# Patient Record
Sex: FEMALE | Race: WHITE | NOT HISPANIC OR LATINO | Employment: OTHER | URBAN - METROPOLITAN AREA
[De-identification: names, ages, dates, MRNs, and addresses within clinical notes are randomized per-mention and may not be internally consistent; named-entity substitution may affect disease eponyms.]

---

## 2018-04-24 ENCOUNTER — TELEPHONE (OUTPATIENT)
Dept: NEUROSURGERY | Facility: CLINIC | Age: 73
End: 2018-04-24

## 2018-04-24 NOTE — TELEPHONE ENCOUNTER
New Patient    Referring Doctor:  Dr. Velazquez    Most Recent Studies:  MRI Brain (04/20/18) [facility]        Reason for Visit:  Incidental findings on an MRI Brain done in 2009 which is compared on this most recent MRI Brain 2018.  No pain or symptoms.  Spot has gotten bigger.      Additional Comments:  Patient has a history of colon cancer.        Insurance Info:  [employer]/Med[employer]are      See Patient Intake Documents in  for outside reports.

## 2018-04-25 NOTE — TELEPHONE ENCOUNTER
New patient referral  Please schedule as routine new patient appt  Please ensure patient brings new and old MRIs for review and comparison

## 2018-05-01 ENCOUNTER — OFFICE VISIT (OUTPATIENT)
Dept: NEUROSURGERY | Facility: CLINIC | Age: 73
End: 2018-05-01
Payer: MEDICARE

## 2018-05-01 VITALS
HEART RATE: 82 BPM | SYSTOLIC BLOOD PRESSURE: 140 MMHG | TEMPERATURE: 98.4 F | BODY MASS INDEX: 23.39 KG/M2 | WEIGHT: 132 LBS | DIASTOLIC BLOOD PRESSURE: 100 MMHG | HEIGHT: 63 IN

## 2018-05-01 DIAGNOSIS — D32.0 CEREBRAL MENINGIOMA (CMS/HCC): Primary | ICD-10-CM

## 2018-05-01 PROCEDURE — 99204 OFFICE O/P NEW MOD 45 MIN: CPT | Performed by: NEUROLOGICAL SURGERY

## 2018-05-01 RX ORDER — NEBIVOLOL HYDROCHLORIDE 5 MG/1
5 TABLET ORAL DAILY
Refills: 5 | COMMUNITY
Start: 2018-04-02 | End: 2022-07-22 | Stop reason: ENTERED-IN-ERROR

## 2018-05-01 RX ORDER — LORAZEPAM 0.5 MG/1
TABLET ORAL
Refills: 3 | COMMUNITY
Start: 2018-03-06 | End: 2022-07-22 | Stop reason: ENTERED-IN-ERROR

## 2018-05-01 ASSESSMENT — ENCOUNTER SYMPTOMS
POLYPHAGIA: 0
BACK PAIN: 0
HEMATURIA: 0
NUMBNESS: 0
NECK STIFFNESS: 0
HEADACHES: 0
DIFFICULTY URINATING: 0
WHEEZING: 0
ARTHRALGIAS: 0
DYSURIA: 0
VOICE CHANGE: 0
SPEECH DIFFICULTY: 0
PHOTOPHOBIA: 0
NECK PAIN: 0
DIZZINESS: 0
DYSPHORIC MOOD: 0
DIARRHEA: 0
FATIGUE: 0
WEAKNESS: 0
ABDOMINAL DISTENTION: 0
RHINORRHEA: 0
MYALGIAS: 0
APNEA: 0
POLYDIPSIA: 0
CONSTIPATION: 0
PALPITATIONS: 0
CHEST TIGHTNESS: 0
BLOOD IN STOOL: 0
TROUBLE SWALLOWING: 0
SEIZURES: 0
COUGH: 0
UNEXPECTED WEIGHT CHANGE: 0
NERVOUS/ANXIOUS: 0
SLEEP DISTURBANCE: 0
SHORTNESS OF BREATH: 0
BRUISES/BLEEDS EASILY: 0
FEVER: 0
VOMITING: 0
ABDOMINAL PAIN: 0
CHILLS: 0
FACIAL SWELLING: 0
NAUSEA: 0

## 2018-05-01 NOTE — PROGRESS NOTES
Visit Date: 2018           Referring Provider: Glenn Velazquez DO    RE: Karin Salas  : 1945  Subjective   CHIEF COMPLAINT: Abnormal Imaging Scan    Karin Salas is a 72 y.o. female presenting today as a new patient evaluation for posterior fossa meningioma.  The patient has a history of colon cancer 4 years ago in which she underwent surgery and chemotherapy and has since been in remission.  However, she recently presented to her PCP with concern for a slight mass on the right side of her upper neck.  Due to her oncologic history it was recommended she undergo an MRI of the brain which demonstrated the presence of a lesion within the posterior fossa.  Back in  the patient had vertigo associated with the medication but underwent imaging at that time as well.  The patient says she was never made aware of the findings but the imaging in  appears to have demonstrated this lesion as well.  However, for the patient this was newly discovered less than 2 weeks ago.  The patient denies headaches, nausea or vomiting.  She self ambulates and denies any difficulty walking or any gait imbalance.  She participates in yoga and says she has no difficulties doing so.  She denies any change in her vision and denies any dizziness or vertigo.  She says that she feels healthy and is very nervous about this recent discovery.    REVIEW OF SYSTEMS:   Review of Systems   Constitutional: Negative for chills, fatigue, fever and unexpected weight change.   HENT: Negative for facial swelling, hearing loss, nosebleeds, rhinorrhea, trouble swallowing and voice change.    Eyes: Negative for photophobia and visual disturbance.   Respiratory: Negative for apnea, cough, chest tightness, shortness of breath and wheezing.    Cardiovascular: Negative for chest pain, palpitations and leg swelling.   Gastrointestinal: Negative for abdominal distention, abdominal pain, blood in stool, constipation, diarrhea, nausea and  "vomiting.   Endocrine: Negative for cold intolerance, heat intolerance, polydipsia and polyphagia.   Genitourinary: Negative for decreased urine volume, difficulty urinating, dysuria and hematuria.   Musculoskeletal: Negative for arthralgias, back pain, gait problem, myalgias, neck pain and neck stiffness.   Skin: Negative for rash.   Allergic/Immunologic: Positive for environmental allergies. Negative for food allergies.   Neurological: Negative for dizziness, seizures, speech difficulty, weakness, numbness and headaches.   Hematological: Does not bruise/bleed easily.   Psychiatric/Behavioral: Negative for dysphoric mood and sleep disturbance. The patient is not nervous/anxious.         PAST MEDICAL HISTORY:  has a past medical history of Hypertension.  PAST SURGICAL HISTORY:  has a past surgical history that includes Appendectomy (1957); Tonsillectomy (1967); Tubal ligation (1978); and Colon surgery (2014).  MEDICATIONS: has a current medication list which includes the following prescription(s): bystolic and lorazepam.  ALLERGIES: is allergic to nsaids (non-steroidal anti-inflammatory drug) and penicillins.  FAMILY HISTORY: family history includes Leukemia in her father.  SOCIAL HISTORY:  reports that she quit smoking about 48 years ago. She has never used smokeless tobacco. She reports that she drinks about 0.6 oz of alcohol per week .  Objective   PHYSICAL EXAM:  BP (!) 140/100   Pulse 82   Temp 36.9 °C (98.4 °F)   Ht 1.6 m (5' 3\")   Wt 59.9 kg (132 lb)   BMI 23.38 kg/m²     Neurologic Exam     Mental Status   Oriented to person, place, and time.   Speech: speech is normal   Level of consciousness: alert  Able to name object. Able to repeat.     Cranial Nerves     CN II   Visual fields full to confrontation.   Right visual field deficit: none  Left visual field deficit: none     CN III, IV, VI   Pupils are equal, round, and reactive to light.  Extraocular motions are normal.   Nystagmus: none   Diplopia: " none    CN V   Facial sensation intact.     CN VII   Facial expression full, symmetric.     CN VIII   CN VIII normal.     CN IX, X   Palate: symmetric    CN XI   CN XI normal.     CN XII   CN XII normal.     Motor Exam   Right arm pronator drift: absent  Left arm pronator drift: absent    Strength   Strength 5/5 throughout.     Sensory Exam   Light touch normal.     Gait, Coordination, and Reflexes     Gait  Gait: normal    Coordination   Finger to nose coordination: normal  Tandem walking coordination: normal    Reflexes   Reflexes 2+ except as noted.   Right Duran: absent  Left Duran: absent  Right ankle clonus: absent  Left ankle clonus: absent      Physical Exam   Constitutional: She is oriented to person, place, and time. She appears well-developed and well-nourished.   HENT:   Head: Normocephalic and atraumatic.   Eyes: EOM are normal. Pupils are equal, round, and reactive to light.   Neck: Normal range of motion. Neck supple. No tracheal deviation present.   Cardiovascular: Normal rate, regular rhythm and normal heart sounds.    No murmur heard.  Pulmonary/Chest: Effort normal and breath sounds normal. She has no wheezes.   Abdominal: Soft. There is no tenderness.   Musculoskeletal: Normal range of motion.   Neurological: She is alert and oriented to person, place, and time. She has normal strength. She has a normal Finger-Nose-Finger Test and a normal Tandem Gait Test. Gait normal.   Psychiatric: She has a normal mood and affect. Her speech is normal. Judgment normal.   Vitals reviewed.        DATA REVIEW: Today I personally reviewed an MRI of the brain that was performed April 20, 2018.  It is worth noting that the radiologist states that these images are compared to prior images performed in 2009 but I am unable to directly compare to myself as I do not have any prior imaging.  The MRI demonstrates a dural based mass that appears consistent with a meningioma within the right posterior fossa that  measures 2.4 cm in its greatest dimension.  It appears to abut the right transverse sinus and tentorium.  There is no evidence of flair signal abnormality surrounding the lesion.  It is mentioned to have some interval increase in size since the prior studies.  The MRI also demonstrates normal size of the ventricles with no shift of the midline structures.  There are multiple white matter lesions which are nonspecific and most consistent with small vessel ischemic disease.  They also read for concern of partial empty sella.    ASSESSMENT/PLAN:  Assessment/Plan   Problem List Items Addressed This Visit     Cerebral meningioma (CMS/HCC) (HCC) - Primary    Relevant Orders    MRI BRAIN WITH AND WITHOUT CONTRAST          In summary, Karin Salas is a pleasant 72-year-old female who has MRI findings of a meningioma within the posterior fossa.  I do not have prior imaging but the radiologist says there is interval increase in size of this lesion since 2009.  The patient tells me she has been informed that it was 0.9 cm which is compared to it being 2.4 cm currently.  I am pleased to see that there is no significant swelling and no shift of midline structures associated with this lesion and the patient demonstrates no appreciable neurological deficits and no neurological signs or symptoms.  I explained to the patient that it has demonstrated growth over the course of 9 years which is not unexpected but without the patient demonstrating any appreciable neurological signs or symptoms I do not believe it necessitates intervention at this time.  I explained to her that surgery as well as focused radiation therapy are both options at this time but I feel that continued observation is also an appropriate option.  The patient states that she wishes to continue to observe this over time and thus I would like to perform a follow-up MRI of the brain in 6 months.  I spent time discussing with her the signs and symptoms to be aware  of and requested that she call my office with any questions or concerns.  I also offered her a referral to a radiation oncologist to discuss radiotherapy but she does not wish to pursue this option at this time.         I want to thank you for the opportunity to participate in Karin Salas's care.   Please call my office should any questions or concerns arise.    Sincerely,  Gumaro Storey MD  05/01/18

## 2018-05-01 NOTE — LETTER
May 1, 2018     Glenn Velazquez DO  [address]  [address]  [facility] [id number]    Patient: Karin Salas   YOB: 1945   Date of Visit: 2018       Dear Dr. Velazquez:    Thank you for referring Karin Salas to me for evaluation. Below are my notes for this consultation.    If you have questions, please do not hesitate to call me. I look forward to following your patient along with you.         Sincerely,        Gumaro Storey MD        CC: No Recipients  Gumaro Storey MD  2018 10:44 AM  Signed  Visit Date: 2018           Referring Provider: Glenn Velazquez DO    RE: Karin Salas  : 1945  Subjective   CHIEF COMPLAINT: Abnormal Imaging Scan    Karin Salas is a 72 y.o. female presenting today as a new patient evaluation for posterior fossa meningioma.  The patient has a history of colon cancer 4 years ago in which she underwent surgery and chemotherapy and has since been in remission.  However, she recently presented to her PCP with concern for a slight mass on the right side of her upper neck.  Due to her oncologic history it was recommended she undergo an MRI of the brain which demonstrated the presence of a lesion within the posterior fossa.  Back in  the patient had vertigo associated with the medication but underwent imaging at that time as well.  The patient says she was never made aware of the findings but the imaging in  appears to have demonstrated this lesion as well.  However, for the patient this was newly discovered less than 2 weeks ago.  The patient denies headaches, nausea or vomiting.  She self ambulates and denies any difficulty walking or any gait imbalance.  She participates in yoga and says she has no difficulties doing so.  She denies any change in her vision and denies any dizziness or vertigo.  She says that she feels healthy and is very nervous about this recent discovery.    REVIEW OF SYSTEMS:   Review of Systems   Constitutional:  Negative for chills, fatigue, fever and unexpected weight change.   HENT: Negative for facial swelling, hearing loss, nosebleeds, rhinorrhea, trouble swallowing and voice change.    Eyes: Negative for photophobia and visual disturbance.   Respiratory: Negative for apnea, cough, chest tightness, shortness of breath and wheezing.    Cardiovascular: Negative for chest pain, palpitations and leg swelling.   Gastrointestinal: Negative for abdominal distention, abdominal pain, blood in stool, constipation, diarrhea, nausea and vomiting.   Endocrine: Negative for cold intolerance, heat intolerance, polydipsia and polyphagia.   Genitourinary: Negative for decreased urine volume, difficulty urinating, dysuria and hematuria.   Musculoskeletal: Negative for arthralgias, back pain, gait problem, myalgias, neck pain and neck stiffness.   Skin: Negative for rash.   Allergic/Immunologic: Positive for environmental allergies. Negative for food allergies.   Neurological: Negative for dizziness, seizures, speech difficulty, weakness, numbness and headaches.   Hematological: Does not bruise/bleed easily.   Psychiatric/Behavioral: Negative for dysphoric mood and sleep disturbance. The patient is not nervous/anxious.         PAST MEDICAL HISTORY:  has a past medical history of Hypertension.  PAST SURGICAL HISTORY:  has a past surgical history that includes Appendectomy (1957); Tonsillectomy (1967); Tubal ligation (1978); and Colon surgery (2014).  MEDICATIONS: has a current medication list which includes the following prescription(s): bystolic and lorazepam.  ALLERGIES: is allergic to nsaids (non-steroidal anti-inflammatory drug) and penicillins.  FAMILY HISTORY: family history includes Leukemia in her father.  SOCIAL HISTORY:  reports that she quit smoking about 48 years ago. She has never used smokeless tobacco. She reports that she drinks about 0.6 oz of alcohol per week .  Objective   PHYSICAL EXAM:  BP (!) 140/100   Pulse 82    "Temp 36.9 °C (98.4 °F)   Ht 1.6 m (5' 3\")   Wt 59.9 kg (132 lb)   BMI 23.38 kg/m²      Neurologic Exam     Mental Status   Oriented to person, place, and time.   Speech: speech is normal   Level of consciousness: alert  Able to name object. Able to repeat.     Cranial Nerves     CN II   Visual fields full to confrontation.   Right visual field deficit: none  Left visual field deficit: none     CN III, IV, VI   Pupils are equal, round, and reactive to light.  Extraocular motions are normal.   Nystagmus: none   Diplopia: none    CN V   Facial sensation intact.     CN VII   Facial expression full, symmetric.     CN VIII   CN VIII normal.     CN IX, X   Palate: symmetric    CN XI   CN XI normal.     CN XII   CN XII normal.     Motor Exam   Right arm pronator drift: absent  Left arm pronator drift: absent    Strength   Strength 5/5 throughout.     Sensory Exam   Light touch normal.     Gait, Coordination, and Reflexes     Gait  Gait: normal    Coordination   Finger to nose coordination: normal  Tandem walking coordination: normal    Reflexes   Reflexes 2+ except as noted.   Right Duran: absent  Left Duran: absent  Right ankle clonus: absent  Left ankle clonus: absent      Physical Exam   Constitutional: She is oriented to person, place, and time. She appears well-developed and well-nourished.   HENT:   Head: Normocephalic and atraumatic.   Eyes: EOM are normal. Pupils are equal, round, and reactive to light.   Neck: Normal range of motion. Neck supple. No tracheal deviation present.   Cardiovascular: Normal rate, regular rhythm and normal heart sounds.    No murmur heard.  Pulmonary/Chest: Effort normal and breath sounds normal. She has no wheezes.   Abdominal: Soft. There is no tenderness.   Musculoskeletal: Normal range of motion.   Neurological: She is alert and oriented to person, place, and time. She has normal strength. She has a normal Finger-Nose-Finger Test and a normal Tandem Gait Test. Gait normal. "   Psychiatric: She has a normal mood and affect. Her speech is normal. Judgment normal.   Vitals reviewed.        DATA REVIEW: Today I personally reviewed an MRI of the brain that was performed April 20, 2018.  It is worth noting that the radiologist states that these images are compared to prior images performed in 2009 but I am unable to directly compare to myself as I do not have any prior imaging.  The MRI demonstrates a dural based mass that appears consistent with a meningioma within the right posterior fossa that measures 2.4 cm in its greatest dimension.  It appears to abut the right transverse sinus and tentorium.  There is no evidence of flair signal abnormality surrounding the lesion.  It is mentioned to have some interval increase in size since the prior studies.  The MRI also demonstrates normal size of the ventricles with no shift of the midline structures.  There are multiple white matter lesions which are nonspecific and most consistent with small vessel ischemic disease.  They also read for concern of partial empty sella.    ASSESSMENT/PLAN:  Assessment/Plan   Problem List Items Addressed This Visit     Cerebral meningioma (CMS/HCC) (HCC) - Primary    Relevant Orders    MRI BRAIN WITH AND WITHOUT CONTRAST          In summary, Karin Salas is a pleasant 72-year-old female who has MRI findings of a meningioma within the posterior fossa.  I do not have prior imaging but the radiologist says there is interval increase in size of this lesion since 2009.  The patient tells me she has been informed that it was 0.9 cm which is compared to it being 2.4 cm currently.  I am pleased to see that there is no significant swelling and no shift of midline structures associated with this lesion and the patient demonstrates no appreciable neurological deficits and no neurological signs or symptoms.  I explained to the patient that it has demonstrated growth over the course of 9 years which is not unexpected but  without the patient demonstrating any appreciable neurological signs or symptoms I do not believe it necessitates intervention at this time.  I explained to her that surgery as well as focused radiation therapy are both options at this time but I feel that continued observation is also an appropriate option.  The patient states that she wishes to continue to observe this over time and thus I would like to perform a follow-up MRI of the brain in 6 months.  I spent time discussing with her the signs and symptoms to be aware of and requested that she call my office with any questions or concerns.  I also offered her a referral to a radiation oncologist to discuss radiotherapy but she does not wish to pursue this option at this time.         I want to thank you for the opportunity to participate in Karin Salas's care.   Please call my office should any questions or concerns arise.    Sincerely,  Gumaro Storey MD  05/01/18

## 2018-05-09 ENCOUNTER — TELEPHONE (OUTPATIENT)
Dept: NEUROSURGERY | Facility: CLINIC | Age: 73
End: 2018-05-09

## 2018-10-31 ENCOUNTER — HOSPITAL ENCOUNTER (OUTPATIENT)
Dept: RADIOLOGY | Facility: CLINIC | Age: 73
Discharge: HOME | End: 2018-10-31
Attending: NEUROLOGICAL SURGERY
Payer: MEDICARE

## 2018-10-31 DIAGNOSIS — D32.0 CEREBRAL MENINGIOMA (CMS/HCC): ICD-10-CM

## 2018-10-31 RX ORDER — GADOTERATE MEGLUMINE 376.9 MG/ML
0.1 INJECTION INTRAVENOUS ONCE
Status: COMPLETED | OUTPATIENT
Start: 2018-10-31 | End: 2018-10-31

## 2018-10-31 RX ADMIN — GADOTERATE MEGLUMINE 12 ML: 376.9 INJECTION INTRAVENOUS at 10:36

## 2018-11-09 ENCOUNTER — TELEPHONE (OUTPATIENT)
Dept: NEUROSURGERY | Facility: CLINIC | Age: 73
End: 2018-11-09

## 2018-11-09 NOTE — TELEPHONE ENCOUNTER
LM pt informed appt with Dr Storey In [facility] at 10am    Arrive 15 mins early - MRI in [facility]

## 2018-11-12 ENCOUNTER — OFFICE VISIT (OUTPATIENT)
Dept: NEUROSURGERY | Facility: CLINIC | Age: 73
End: 2018-11-12
Payer: MEDICARE

## 2018-11-12 VITALS
TEMPERATURE: 97.9 F | DIASTOLIC BLOOD PRESSURE: 96 MMHG | HEIGHT: 63 IN | HEART RATE: 81 BPM | BODY MASS INDEX: 23.99 KG/M2 | OXYGEN SATURATION: 96 % | SYSTOLIC BLOOD PRESSURE: 144 MMHG | WEIGHT: 135.4 LBS

## 2018-11-12 DIAGNOSIS — D32.0 CEREBRAL MENINGIOMA (CMS/HCC): Primary | ICD-10-CM

## 2018-11-12 PROBLEM — F41.9 ANXIETY: Status: ACTIVE | Noted: 2017-05-08

## 2018-11-12 PROCEDURE — 99213 OFFICE O/P EST LOW 20 MIN: CPT | Performed by: NEUROLOGICAL SURGERY

## 2018-11-12 NOTE — PROGRESS NOTES
Visit Date: 2018           Referring Provider: No ref. provider found    RE: Karin Salas  : 1945  Subjective   CHIEF COMPLAINT: Results (MRI review)    Karin Salas is a 73 y.o. female presenting today as a follow-up visit for posterior fossa meningioma.  The patient has a history of colon cancer 4 years ago in which she underwent surgery and chemotherapy and has since been in remission.  However, she presented to her PCP with concern for a slight mass on the right side of her upper neck.  Due to her oncologic history it was recommended she undergo an MRI of the brain which demonstrated the presence of a lesion within the posterior fossa.  Back in  the patient had vertigo associated with the medication but underwent imaging at that time as well.  The patient says she was never made aware of the findings but the imaging in  appears to have demonstrated this lesion as well.  However, for the patient this was newly discovered on the MRI in April.  I evaluated her for the first time in May of this year and she demonstrated no appreciable neurologic signs or symptoms and thus we decided to proceed with radiographic f/u imaging.    She returns today stating she is doing well and has no issues at this time. The patient denies headaches, nausea or vomiting.  She self ambulates and denies any difficulty walking or any gait imbalance.  She denies any change in her vision and denies any dizziness or vertigo.       PAST MEDICAL HISTORY:  has a past medical history of Hypertension.  PAST SURGICAL HISTORY:  has a past surgical history that includes Appendectomy (); Tonsillectomy (); Tubal ligation (); and Colon surgery ().  MEDICATIONS: has a current medication list which includes the following prescription(s): bystolic, diphenhydramine, and lorazepam.  ALLERGIES: is allergic to nsaids (non-steroidal anti-inflammatory drug) and penicillins.  FAMILY HISTORY: family history includes  "Leukemia in her father.  SOCIAL HISTORY:  reports that she quit smoking about 48 years ago. She has never used smokeless tobacco. She reports that she drinks about 0.6 oz of alcohol per week .  Objective   PHYSICAL EXAM:  BP (!) 144/96 (BP Location: Right upper arm, Patient Position: Sitting)   Pulse 81   Temp 36.6 °C (97.9 °F)   Ht 1.6 m (5' 3\")   Wt 61.4 kg (135 lb 6.4 oz)   SpO2 96%   BMI 23.99 kg/m²     Physical Exam   Constitutional: She is oriented to person, place, and time. She appears well-developed and well-nourished.   HENT:   Head: Normocephalic and atraumatic.   Eyes: EOM are normal. Pupils are equal, round, and reactive to light.   Neck: Normal range of motion. Neck supple. No tracheal deviation present.   Cardiovascular: Normal rate, regular rhythm and normal heart sounds.    No murmur heard.  Pulmonary/Chest: Effort normal.   Abdominal: Soft.   Musculoskeletal: Normal range of motion.   Neurological: She is alert and oriented to person, place, and time. She has normal strength. She has a normal Finger-Nose-Finger Test. Gait normal.   Psychiatric: She has a normal mood and affect. Her speech is normal. Judgment normal.   Vitals reviewed.      Neurologic Exam     Mental Status   Oriented to person, place, and time.   Speech: speech is normal   Level of consciousness: alert  Able to name object. Able to repeat.     Cranial Nerves     CN II   Visual fields full to confrontation.   Right visual field deficit: none  Left visual field deficit: none     CN III, IV, VI   Pupils are equal, round, and reactive to light.  Extraocular motions are normal.   Nystagmus: none     CN V   Facial sensation intact.     CN VII   Facial expression full, symmetric.     CN VIII   CN VIII normal.     CN XI   CN XI normal.     CN XII   CN XII normal.     Motor Exam   Right arm pronator drift: absent  Left arm pronator drift: absent    Strength   Strength 5/5 throughout.     Sensory Exam   Light touch normal.     Gait, " Coordination, and Reflexes     Gait  Gait: normal    Coordination   Finger to nose coordination: normal        DATA REVIEW: Today I personally reviewed an MRI of the brain that was performed October 31, 2018 and compared this scan to the one performed April 20, 2018.  The MRI again demonstrates a dural based mass that appears consistent with a meningioma within the right posterior fossa that measures 2.4 cm in its greatest dimension.  It appears to abut the right transverse sinus and tentorium.  There is no evidence of flair signal abnormality surrounding the lesion.  When comparing this image to one performed in April the lesion appears stable in size.  The MRI also demonstrates normal size of the ventricles with no shift of the midline structures.  There are multiple white matter lesions which are nonspecific and most consistent with small vessel ischemic disease.     ASSESSMENT/PLAN:  Assessment/Plan   Problem List Items Addressed This Visit     Cerebral meningioma (CMS/HCC) (HCC) - Primary    Relevant Orders    MRI BRAIN WITH AND WITHOUT CONTRAST          In summary, Karin Salas is a pleasant 73-year-old female who was found to have what is likely a meningioma within the posterior fossa demonstrated by MRI imaging.  At this point in time she does not demonstrate any neurological signs or symptoms on exam.  I am pleased to see that the recent MRI shows that over the past 6 months the lesion demonstrates stability in size.  The one concerning issue is that there are reports of this lesion being smaller back in 2009.  I have again recommended to the patient that she work to obtain these images for further comparison.  However given that she demonstrates no appreciable signs or symptoms in the lesion is stable in size I believe the patient can continue with further observation over time and I recommend a follow-up MRI of the brain performed with and without contrast in 6 months.  She will be scheduled in the  neurosurgery clinic with that follow-up imaging and hopefully also the scan from 2009.  She can call my office with any questions or concerns should they arise prior to that time point.         I want to thank you for the opportunity to participate in Karin Salas's care.   Please call my office should any questions or concerns arise.    Sincerely,      Gumaro Storey MD  11/12/18

## 2019-04-30 ENCOUNTER — HOSPITAL ENCOUNTER (OUTPATIENT)
Dept: RADIOLOGY | Facility: HOSPITAL | Age: 74
Discharge: HOME | End: 2019-04-30
Attending: NEUROLOGICAL SURGERY
Payer: MEDICARE

## 2019-04-30 VITALS — BODY MASS INDEX: 23.38 KG/M2 | WEIGHT: 132 LBS

## 2019-04-30 DIAGNOSIS — D32.0 CEREBRAL MENINGIOMA (CMS/HCC): ICD-10-CM

## 2019-04-30 RX ORDER — GADOBUTROL 604.72 MG/ML
6 INJECTION INTRAVENOUS ONCE
Status: COMPLETED | OUTPATIENT
Start: 2019-04-30 | End: 2019-04-30

## 2019-04-30 RX ADMIN — GADOBUTROL 6 ML: 604.72 INJECTION INTRAVENOUS at 13:59

## 2019-05-03 ENCOUNTER — TELEPHONE (OUTPATIENT)
Dept: NEUROSURGERY | Facility: CLINIC | Age: 74
End: 2019-05-03

## 2019-05-20 ENCOUNTER — OFFICE VISIT (OUTPATIENT)
Dept: NEUROSURGERY | Facility: CLINIC | Age: 74
End: 2019-05-20
Payer: MEDICARE

## 2019-05-20 VITALS — OXYGEN SATURATION: 97 % | HEART RATE: 85 BPM | HEIGHT: 63 IN | BODY MASS INDEX: 23.57 KG/M2 | WEIGHT: 133 LBS

## 2019-05-20 DIAGNOSIS — D32.9 MENINGIOMA (CMS/HCC): Primary | ICD-10-CM

## 2019-05-20 PROCEDURE — 99214 OFFICE O/P EST MOD 30 MIN: CPT | Performed by: NEUROLOGICAL SURGERY

## 2019-05-20 NOTE — PROGRESS NOTES
[facility]  Medical Office Building [address], [address]  [place] [address]  Phone: [phone]  Fax: [phone]      Patient ID: Karin Salas                              : 1945    Visit Date: 2019    History of Present Illness:     Karin Salas is a pleasant 74 y.o. female seen today by the neurosurgery service as a consult for posterior fossa meningioma. The patient has a history of colon cancer 4 years ago in which she underwent surgery and chemotherapy and has since been in remission.  However, she presented to her PCP with concern for a slight mass on the right side of her upper neck.  Due to her oncologic history it was recommended she undergo an MRI of the brain which demonstrated the presence of a lesion within the posterior fossa. She presented to Dr. Storey a year ago and she demonstrated no appreciable neurologic signs or symptoms and thus they decided to monitory the meningioma with annual imaging.      Since last being seen, reports that she continues to be asymptomatic. She has no headaches, dizziness, change in vision, weakness, gait instability, or paresthesias. 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     Allergies:  Allergies   Allergen Reactions   • Nsaids (Non-Steroidal Anti-Inflammatory Drug) Other (see comments)     Vertigo   • Penicillins Rash       Medications:     Current Outpatient Prescriptions:   •  BYSTOLIC 5 mg tablet, TK 1 T PO D, Disp: , Rfl: 5  •  diphenhydrAMINE (BENADRYL) 25 mg capsule, Take 25 mg by mouth every 6 (six) hours as needed., Disp: , Rfl:   •  LORazepam (ATIVAN) 0.5 mg tablet, TK 1 T PO Q 8 H PRF ANXIETY, Disp: , Rfl: 3     Past Medical History:   Past Medical History:   Diagnosis Date   • Hypertension        Past Surgical History:   Past Surgical History:   Procedure Laterality Date   • APPENDECTOMY     • COLON SURGERY      Tumor removed   • TONSILLECTOMY      • TUBAL LIGATION  1978       Family History:  Family History   Problem Relation Age of Onset   • Leukemia Father        Social History:  Social History     Social History   • Marital status:      Spouse name: N/A   • Number of children: N/A   • Years of education: N/A     Occupational History   • Not on file.     Social History Main Topics   • Smoking status: Former Smoker     Quit date: 1970   • Smokeless tobacco: Never Used   • Alcohol use 0.6 oz/week     1 Glasses of wine per week      Comment: occasionally   • Drug use: Unknown   • Sexual activity: Not on file     Other Topics Concern   • Not on file     Social History Narrative   • No narrative on file       Vitals: There were no vitals filed for this visit.    Review of Systems:  A complete 14 point review of systems was conducted and was deemed negative except what was documented in the HPI.    Physical Examination:  Physical Exam   Constitutional: Oriented to person, place, and time. Appears well-developed and well-nourished.   Head: Normocephalic and atraumatic.   Right Ear: External ear normal.   Left Ear: External ear normal.   Nose: Nose normal.   Mouth/Throat: Oropharynx is clear and moist.   Eyes: Conjunctivae and EOM are normal. Pupils are equal, round, and reactive to light. No scleral icterus.   Neck: Normal range of motion. Neck supple. No JVD present. No tracheal deviation present.   Cardiovascular: Normal rate and regular rhythm.    Pulmonary/Chest: Effort normal and breath sounds normal. No stridor. No respiratory distress. No wheezes. No tenderness.   Abdominal: Soft. No distension. There is no tenderness. There is no rebound and no guarding.   Musculoskeletal: Normal range of motion. No edema or tenderness.   Neurological: Oriented to person, place, and time.   Skin: Skin is warm and dry. No rash noted. No erythema.   Psychiatric: Normal mood and affect. Speech is normal and behavior is normal. Thought content normal.   Vitals  reviewed.      Neurological Examination:  Neurologic Exam     Mental Status   Oriented to person, place, and time.   Attention: normal.   Speech: speech is normal   Level of consciousness: alert    Cranial Nerves     CN II   Visual acuity: normal    CN III, IV, VI   Pupils are equal, round, and reactive to light.  Extraocular motions are normal.   Right pupil: Size: 3 mm. Shape: regular. Reactivity: brisk.   Left pupil: Size: 3 mm. Shape: regular. Reactivity: brisk.   CN III: no CN III palsy  CN VI: no CN VI palsy  Nystagmus: none     CN V   Facial sensation intact.     CN VIII   CN VIII normal.   Hearing: intact    CN IX, X   CN IX normal.   Palate: symmetric    CN XI   CN XI normal.   Right sternocleidomastoid strength: normal  Left sternocleidomastoid strength: normal    CN XII   CN XII normal.   Tongue: not atrophic    Sensation ( /2)    Right Left  Right Left   C5 2 2 L2 2 2   C6 2 2 L3 2 2   C7 2 2 L4 2 2   C8 2 2 L5 2 2   T1 2 2 S1 2 2     Motor:     Deltoid Biceps Triceps Wrist ext Finger ext Hand Intrinsics Hip flexion Knee ext Dorsi-  flexion EHL Plantar Flexion   R 5 5 5 5 5 5 5 5 5 5 5   L 5 5 5 5 5 5 5 5 5 5 5     There is no pronator drift. Muscle bulk and tone are normal. Bilateral familial tremor, right worse than left.    Gait, Coordination, and Reflexes     Reflexes   Reflexes 2+ except as noted.   Right plantar: normal  Left plantar: normal  Right Duran: absent  Left Duran: absent  Right ankle clonus: absent  Left ankle clonus: absent      Data Review:    Lab Results:   No results found for: WBC, HGB, HCT, MCV, PLT, RDW   No results found for: GLUCOSE, GLUC, CREAT, BUN, NA, K, CL, CO2, ANIONGAP, CA     Imaging:   Independent review of all imaging was done by myself as well as review of the radiologists readings and comparison to prior films.     MRI BRAIN 10/31/18  2.5 x 2.4 x 2 cm right posterior fossa infratentorial meningioma,  stable.     MRI BRAIN 4/30/19   Grossly stable right posterior  fossa meningioma.    Assessment / Plan: In summary, Karin Salas is a 74 year old female with a posterior fossa meningioma, stable on 6 month imaging. She continues to be asymptomatic and neurologically intact. She will have a repeat MRI brain in 1 year as we continue to monitor the meningioma. She will return to clinic sooner should she develop any symptoms.             Patient seen earlier today. Signature timestamp does not reflect patient encounter time.   More than 50% of the time is spent counseling the patient and family and coordinating care.

## 2019-05-20 NOTE — LETTER
May 20, 2019     Glenn Velazquez DO  [address]  [facility] [address]    Patient: Krain Salas   YOB: 1945   Date of Visit: 2019       Dear Dr. Velazquez:    Thank you for referring Karin Salas to me for evaluation. Below are my notes for this consultation.    If you have questions, please do not hesitate to call me. I look forward to following your patient along with you.         Sincerely,        Taj Rosado MD        CC: Yolie Recipients  Taj Rosado MD  2019 12:11 PM  Signed      [facility]  Medical Office Building [address], [address]  [place] [address]  Phone: [phone]  Fax: [phone]      Patient ID: Karin Salas                              : 1945    Visit Date: 2019    History of Present Illness:     Karin Salas is a pleasant 74 y.o. female seen today by the neurosurgery service as a consult for posterior fossa meningioma. The patient has a history of colon cancer 4 years ago in which she underwent surgery and chemotherapy and has since been in remission.  However, she presented to her PCP with concern for a slight mass on the right side of her upper neck.  Due to her oncologic history it was recommended she undergo an MRI of the brain which demonstrated the presence of a lesion within the posterior fossa. She presented to Dr. Storey a year ago and she demonstrated no appreciable neurologic signs or symptoms and thus they decided to monitory the meningioma with annual imaging.      Since last being seen, reports that she continues to be asymptomatic. She has no headaches, dizziness, change in vision, weakness, gait instability, or paresthesias. 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     Allergies:  Allergies   Allergen Reactions   • Nsaids (Non-Steroidal Anti-Inflammatory Drug) Other (see comments)     Vertigo   • Penicillins Rash       Medications:     Current  Outpatient Prescriptions:   •  BYSTOLIC 5 mg tablet, TK 1 T PO D, Disp: , Rfl: 5  •  diphenhydrAMINE (BENADRYL) 25 mg capsule, Take 25 mg by mouth every 6 (six) hours as needed., Disp: , Rfl:   •  LORazepam (ATIVAN) 0.5 mg tablet, TK 1 T PO Q 8 H PRF ANXIETY, Disp: , Rfl: 3     Past Medical History:   Past Medical History:   Diagnosis Date   • Hypertension        Past Surgical History:   Past Surgical History:   Procedure Laterality Date   • APPENDECTOMY  1957   • COLON SURGERY  2014    Tumor removed   • TONSILLECTOMY  1967   • TUBAL LIGATION  1978       Family History:  Family History   Problem Relation Age of Onset   • Leukemia Father        Social History:  Social History     Social History   • Marital status:      Spouse name: N/A   • Number of children: N/A   • Years of education: N/A     Occupational History   • Not on file.     Social History Main Topics   • Smoking status: Former Smoker     Quit date: 1970   • Smokeless tobacco: Never Used   • Alcohol use 0.6 oz/week     1 Glasses of wine per week      Comment: occasionally   • Drug use: Unknown   • Sexual activity: Not on file     Other Topics Concern   • Not on file     Social History Narrative   • No narrative on file       Vitals: There were no vitals filed for this visit.    Review of Systems:  A complete 14 point review of systems was conducted and was deemed negative except what was documented in the HPI.    Physical Examination:  Physical Exam   Constitutional: Oriented to person, place, and time. Appears well-developed and well-nourished.   Head: Normocephalic and atraumatic.   Right Ear: External ear normal.   Left Ear: External ear normal.   Nose: Nose normal.   Mouth/Throat: Oropharynx is clear and moist.   Eyes: Conjunctivae and EOM are normal. Pupils are equal, round, and reactive to light. No scleral icterus.   Neck: Normal range of motion. Neck supple. No JVD present. No tracheal deviation present.   Cardiovascular: Normal rate and  regular rhythm.    Pulmonary/Chest: Effort normal and breath sounds normal. No stridor. No respiratory distress. No wheezes. No tenderness.   Abdominal: Soft. No distension. There is no tenderness. There is no rebound and no guarding.   Musculoskeletal: Normal range of motion. No edema or tenderness.   Neurological: Oriented to person, place, and time.   Skin: Skin is warm and dry. No rash noted. No erythema.   Psychiatric: Normal mood and affect. Speech is normal and behavior is normal. Thought content normal.   Vitals reviewed.      Neurological Examination:  Neurologic Exam     Mental Status   Oriented to person, place, and time.   Attention: normal.   Speech: speech is normal   Level of consciousness: alert    Cranial Nerves     CN II   Visual acuity: normal    CN III, IV, VI   Pupils are equal, round, and reactive to light.  Extraocular motions are normal.   Right pupil: Size: 3 mm. Shape: regular. Reactivity: brisk.   Left pupil: Size: 3 mm. Shape: regular. Reactivity: brisk.   CN III: no CN III palsy  CN VI: no CN VI palsy  Nystagmus: none     CN V   Facial sensation intact.     CN VIII   CN VIII normal.   Hearing: intact    CN IX, X   CN IX normal.   Palate: symmetric    CN XI   CN XI normal.   Right sternocleidomastoid strength: normal  Left sternocleidomastoid strength: normal    CN XII   CN XII normal.   Tongue: not atrophic    Sensation ( /2)    Right Left  Right Left   C5 2 2 L2 2 2   C6 2 2 L3 2 2   C7 2 2 L4 2 2   C8 2 2 L5 2 2   T1 2 2 S1 2 2     Motor:     Deltoid Biceps Triceps Wrist ext Finger ext Hand Intrinsics Hip flexion Knee ext Dorsi-  flexion EHL Plantar Flexion   R 5 5 5 5 5 5 5 5 5 5 5   L 5 5 5 5 5 5 5 5 5 5 5     There is no pronator drift. Muscle bulk and tone are normal. Bilateral familial tremor, right worse than left.    Gait, Coordination, and Reflexes     Reflexes   Reflexes 2+ except as noted.   Right plantar: normal  Left plantar: normal  Right Duran: absent  Left Duran:  absent  Right ankle clonus: absent  Left ankle clonus: absent      Data Review:    Lab Results:   No results found for: WBC, HGB, HCT, MCV, PLT, RDW   No results found for: GLUCOSE, GLUC, CREAT, BUN, NA, K, CL, CO2, ANIONGAP, CA     Imaging:   Independent review of all imaging was done by myself as well as review of the radiologists readings and comparison to prior films.     MRI BRAIN 10/31/18  2.5 x 2.4 x 2 cm right posterior fossa infratentorial meningioma,  stable.     MRI BRAIN 4/30/19   Grossly stable right posterior fossa meningioma.    Assessment / Plan: In summary, Karin Salas is a 74 year old female with a posterior fossa meningioma, stable on 6 month imaging. She continues to be asymptomatic and neurologically intact. She will have a repeat MRI brain in 1 year as we continue to monitor the meningioma. She will return to clinic sooner should she develop any symptoms.             Patient seen earlier today. Signature timestamp does not reflect patient encounter time.   More than 50% of the time is spent counseling the patient and family and coordinating care.

## 2019-05-30 ENCOUNTER — TELEPHONE (OUTPATIENT)
Dept: NEUROSURGERY | Facility: CLINIC | Age: 74
End: 2019-05-30

## 2020-04-07 ENCOUNTER — TELEPHONE (OUTPATIENT)
Dept: OPERATING ROOM | Facility: HOSPITAL | Age: 75
End: 2020-04-07

## 2020-04-07 DIAGNOSIS — D32.9 MENINGIOMA (CMS/HCC): Primary | ICD-10-CM

## 2020-04-07 NOTE — TELEPHONE ENCOUNTER
Called the patient as it is time for her annual MRI of the brain.  She will have this performed at [facility] when she is comfortable doing so due to Covid-19.  She will advise us when it has been done.    Script sent out to patient today.  Patient has Medicare insurance, so no pre-cert needed.

## 2020-10-27 DIAGNOSIS — D32.9 MENINGIOMA (CMS/HCC): Primary | ICD-10-CM

## 2021-01-22 ENCOUNTER — HOSPITAL ENCOUNTER (OUTPATIENT)
Dept: RADIOLOGY | Facility: HOSPITAL | Age: 76
Discharge: HOME | End: 2021-01-22
Attending: NEUROLOGICAL SURGERY
Payer: MEDICARE

## 2021-01-22 DIAGNOSIS — D32.9 MENINGIOMA (CMS/HCC): ICD-10-CM

## 2021-01-22 DIAGNOSIS — D32.9 MENINGIOMA (CMS/HCC): Primary | ICD-10-CM

## 2021-01-22 RX ORDER — GADOBUTROL 604.72 MG/ML
5.9 INJECTION INTRAVENOUS ONCE
Status: COMPLETED | OUTPATIENT
Start: 2021-01-22 | End: 2021-01-22

## 2021-01-22 RX ADMIN — GADOBUTROL 5.9 ML: 604.72 INJECTION INTRAVENOUS at 13:20

## 2021-02-10 ENCOUNTER — OFFICE VISIT (OUTPATIENT)
Dept: NEUROSURGERY | Facility: CLINIC | Age: 76
End: 2021-02-10
Payer: MEDICARE

## 2021-02-10 VITALS
OXYGEN SATURATION: 95 % | HEIGHT: 63 IN | BODY MASS INDEX: 23.04 KG/M2 | DIASTOLIC BLOOD PRESSURE: 90 MMHG | RESPIRATION RATE: 16 BRPM | TEMPERATURE: 97.5 F | HEART RATE: 81 BPM | SYSTOLIC BLOOD PRESSURE: 150 MMHG | WEIGHT: 130 LBS

## 2021-02-10 DIAGNOSIS — D32.0 CEREBRAL MENINGIOMA (CMS/HCC): Primary | ICD-10-CM

## 2021-02-10 PROCEDURE — 99214 OFFICE O/P EST MOD 30 MIN: CPT | Performed by: NEUROLOGICAL SURGERY

## 2021-02-10 NOTE — PROGRESS NOTES
[facility]  Medical Office Building [address], [address]  [place] [address]  Phone: [phone]  Fax: [phone]      Patient ID: Karin Salas                              : 1945    Visit Date: 2/10/2021    Referring Provider: No ref. provider found    Chief Complaint / History of Present Illness:   Karin Salas is a pleasant 75 y.o. female history of meningioma, colon CA s/p resection in ,  Vertigo, HTN and anxiety seen today for meningioma followup.    Her meningioma was first discovered on imaging in  however the patient was told about it after she presented to her PCP with concern for a slight mass on the right side of her upper neck.  Due to her oncologic history it was recommended she undergo an MRI of the brain which demonstrated the presence of a lesion within the posterior fossa.  Back in  the patient had vertigo associated with the medication but underwent imaging at that time as well.  The patient says she was never made aware of the findings but the imaging in  appears to have demonstrated this lesion as well.  She saw Dr. Valencia from Neurosurgery at  in 2018 who recommended monitoring as she was asymptomatic.  She then saw Dr. Rosado from our office in 2019 who repeated MRI which was stable and again recommended surveillance with plan for repeat MRI in 1 year.      She presents today after undergoing MRI brain on 21 which showed slight increase in the lesion compared to imaging in 2019 but a significant  Increase compared to her initial MRI in 2009.  Today she reports she  Is feeling well and is without complaint.  She Denies neck pain, vision changes, facial weakness/numbness, headache, gait imbalance, hearing changes, focal UE/LE numbness/weakness, changes in swallowing, speech changes.        Allergies:  Allergies   Allergen Reactions   • Nsaids (Non-Steroidal Anti-Inflammatory Drug) Other (see comments)     Vertigo   •  Penicillins Rash       Medications:     Current Outpatient Medications:   •  atorvastatin (LIPITOR) 10 mg tablet, Take 10 mg by mouth daily., Disp: , Rfl:   •  BYSTOLIC 5 mg tablet, daily. , Disp: , Rfl: 5  •  diphenhydrAMINE (BENADRYL) 25 mg capsule, Take 25 mg by mouth every 6 (six) hours as needed., Disp: , Rfl:   •  LORazepam (ATIVAN) 0.5 mg tablet, TK 1 T PO Q 8 H PRF ANXIETY, Disp: , Rfl: 3     Past Medical History:   Past Medical History:   Diagnosis Date   • Hypertension        Past Surgical History:   Past Surgical History:   Procedure Laterality Date   • APPENDECTOMY     • COLON SURGERY      Tumor removed   • TONSILLECTOMY     • TUBAL LIGATION         Family History:  Family History   Problem Relation Age of Onset   • Leukemia Biological Father        Social History:  Social History     Socioeconomic History   • Marital status:      Spouse name: Not on file   • Number of children: Not on file   • Years of education: Not on file   • Highest education level: Not on file   Occupational History   • Not on file   Social Needs   • Financial resource strain: Not on file   • Food insecurity     Worry: Not on file     Inability: Not on file   • Transportation needs     Medical: Not on file     Non-medical: Not on file   Tobacco Use   • Smoking status: Former Smoker     Quit date:      Years since quittin.1   • Smokeless tobacco: Never Used   Substance and Sexual Activity   • Alcohol use: Yes     Alcohol/week: 1.0 standard drinks     Types: 1 Glasses of wine per week     Comment: occasionally   • Drug use: Not on file   • Sexual activity: Not on file   Lifestyle   • Physical activity     Days per week: Not on file     Minutes per session: Not on file   • Stress: Not on file   Relationships   • Social connections     Talks on phone: Not on file     Gets together: Not on file     Attends [employer] service: Not on file     Active member of club or organization: Not on file     Attends  meetings of clubs or organizations: Not on file     Relationship status: Not on file   • Intimate partner violence     Fear of current or ex partner: Not on file     Emotionally abused: Not on file     Physically abused: Not on file     Forced sexual activity: Not on file   Other Topics Concern   • Not on file   Social History Narrative   • Not on file       Vitals: There were no vitals filed for this visit.    Review of Systems:  A complete 14 point review of systems was conducted and was deemed negative except what was documented in the HPI.    Physical Examination:  Physical Exam   Constitutional: Oriented to person, place, and time. Appears well-developed and well-nourished.   Head: Normocephalic and atraumatic.   Right Ear: External ear normal.   Left Ear: External ear normal.   Nose: Nose normal.   Mouth/Throat: Oropharynx is clear and moist.   Eyes: Conjunctivae and EOM are normal. Pupils are equal, round, and reactive to light. No scleral icterus.   Neck: Normal range of motion.   Cardiovascular: Normal rate.    Pulmonary/Chest: No respiratory distress.   Abdominal: Soft. Exhibits no distension.  Musculoskeletal: Normal range of motion. No edema or tenderness.   Neurological: Oriented to person, place, and time.   Skin: Skin is warm and dry. No rash noted. No erythema.   Psychiatric: Normal mood and affect. Speech is normal and behavior is normal. Thought content normal.     Vitals reviewed.    Neurological Examination:  Neurologic Exam     Mental Status   Oriented to person, place, and time.   Attention: normal.   Speech: speech is normal   Level of consciousness: alert    Cranial Nerves     CN II: Visual fields are full to confrontation.  Pupils are equal and briskly reactive to light.   CN III, IV, VI: Extra-occular muscles are intact  CN V: Facial sensation is intact and equal in V1-V3 distributions bilaterally.   CN VII: Face is symmetric with normal eye closure and smile.  CN VIII: Hearing is normal to  rubbing fingers  CN IX, X: Palate elevates symmetrically. Phonation is normal.  CN XI: Head turning and shoulder shrug are intact  CN XII: Tongue is midline with normal movements and no atrophy.    Sensation ( /2)    Right Left  Right Left   C5 2 2 L2 2 2   C6 2 2 L3 2 2   C7 2 2 L4 2 2   C8 2 2 L5 2 2   T1 2 2 S1 2 2     Motor:     Deltoid Biceps Triceps Wrist ext Finger ext Hand Intrinsics Hip flexion Knee ext Dorsi-  flexion EHL Plantar Flexion   R 5 5 5 5 5 5 5 5 5 5 5   L 5 5 5 5 5 5 5 5 5 5 5     There is no pronator drift. Muscle bulk and tone are normal.     Gait, Coordination, and Reflexes     Reflexes   Reflexes 2+ except as noted.   Right Duran: absent  Left Duran: absent  Right ankle clonus: absent  Left ankle clonus: absent      Data Review:    Lab Results:   No results found for: WBC, HGB, HCT, MCV, PLT, RDW   No results found for: GLUCOSE, GLUC, CREAT, BUN, NA, K, CL, CO2, ANIONGAP, CA     Imaging:   Independent review of all imaging was done by myself as well as review of the radiologists readings and comparison to prior films.       MRI Brain 1/22/2:  Right posterior fossa extra-axial mass mildly increased from scan in 2019        Assessment / Plan: In summary, Karin Salas is a 75 y.o. female seen today for evaluation of right sided posterior fossa meningioma.  Patient has previously been managed with routine surveillance imaging for the lesion and is feeling well at her visit today.  She is remained asymptomatic.  I reviewed her recent MRIs.  There is a report from the MRI from 3/23/2009 which states at that time the mass measured approximately 1 cm in its greatest dimension.  In 2018 it measured approximately 2.5 x 2.4 x 2 cm and is now currently measured at 2.7 x 2.6 x 2.1 cm.  While the lesion is near the transverse sigmoid junction it does not appear to directly occlude either.  There is no significant edema.  Nor any significant regional mass-effect.    I had a lengthy discussion with  the patient regarding treatment options.  Given the benign appearing nature of the meningioma, lack of referable symptoms presently, and the patient's age I think continued observation is certainly reasonable.  I explained to the patient that the mass will likely continue to grow but slowly as it has been doing.  If the rate of growth accelerates or if he becomes symptomatic patient may require treatment.  Patient is very comfortable with expectant management.  Recommend repeat MRI with and without gadolinium in 1 years time and follow-up afterwards.      Troy Arango MD

## 2021-04-14 DIAGNOSIS — Z23 ENCOUNTER FOR IMMUNIZATION: ICD-10-CM

## 2022-02-17 ENCOUNTER — TELEPHONE (OUTPATIENT)
Dept: NEUROSURGERY | Facility: CLINIC | Age: 77
End: 2022-02-17
Payer: MEDICARE

## 2022-02-17 DIAGNOSIS — D32.0 CEREBRAL MENINGIOMA (CMS/HCC): Primary | ICD-10-CM

## 2022-02-28 ENCOUNTER — HOSPITAL ENCOUNTER (OUTPATIENT)
Dept: RADIOLOGY | Facility: HOSPITAL | Age: 77
Discharge: HOME | End: 2022-02-28
Attending: NEUROLOGICAL SURGERY
Payer: MEDICARE

## 2022-02-28 DIAGNOSIS — D32.0 CEREBRAL MENINGIOMA (CMS/HCC): ICD-10-CM

## 2022-02-28 RX ORDER — GADOBUTROL 604.72 MG/ML
5.8 INJECTION INTRAVENOUS ONCE
Status: COMPLETED | OUTPATIENT
Start: 2022-02-28 | End: 2022-02-28

## 2022-02-28 RX ADMIN — GADOBUTROL 5.9 ML: 604.72 INJECTION INTRAVENOUS at 13:21

## 2022-03-04 ENCOUNTER — OFFICE VISIT (OUTPATIENT)
Dept: NEUROSURGERY | Facility: CLINIC | Age: 77
End: 2022-03-04
Payer: MEDICARE

## 2022-03-04 VITALS
DIASTOLIC BLOOD PRESSURE: 100 MMHG | SYSTOLIC BLOOD PRESSURE: 160 MMHG | BODY MASS INDEX: 23.92 KG/M2 | HEART RATE: 69 BPM | HEIGHT: 63 IN | OXYGEN SATURATION: 97 % | WEIGHT: 135 LBS | TEMPERATURE: 96.1 F | RESPIRATION RATE: 20 BRPM

## 2022-03-04 DIAGNOSIS — D32.0 CEREBRAL MENINGIOMA (CMS/HCC): Primary | ICD-10-CM

## 2022-03-04 PROCEDURE — 99213 OFFICE O/P EST LOW 20 MIN: CPT | Performed by: NEUROLOGICAL SURGERY

## 2022-03-04 NOTE — PROGRESS NOTES
[facility]  Medical Office Building [address], [address]  [place] [address]  Phone: [phone]  Fax: [phone]      Patient ID: Karin Salas                              : 1945    Visit Date: 3/4/2022    Chief Complaint / History of Present Illness:   Karin Salas is a pleasant 76 y.o. female history of meningioma, colon CA s/p resection in ,  Vertigo, HTN and anxiety seen today for meningioma followup.    Her meningioma was first discovered on imaging in  however the patient was told about it after she presented to her PCP with concern for a slight mass on the right side of her upper neck.  Due to her oncologic history it was recommended she undergo an MRI of the brain which demonstrated the presence of a lesion within the posterior fossa.  Back in  the patient had vertigo associated with the medication but underwent imaging at that time as well.  The patient says she was never made aware of the findings but the imaging in  appears to have demonstrated this lesion as well.  She saw Dr. Valencia from Neurosurgery at  in  who recommended monitoring as she was asymptomatic.  She returned our office in  who repeated MRI which was stable and again recommended surveillance with plan for repeat MRI in 1 year. MRI in  revealed slight, 1-2mm growth. Surveillance was again recommended.    She presents today after undergoing MRI brain on 22 which showed slight increase in the lesion compared to imaging in . Today she reports she is feeling well and is without complaint.  She denies neck pain, vision changes, facial weakness/numbness, headache, gait imbalance, hearing changes, focal UE/LE numbness/weakness, changes in swallowing, speech changes.        Allergies:  Allergies   Allergen Reactions   • Nsaids (Non-Steroidal Anti-Inflammatory Drug) Other (see comments)     Vertigo   • Penicillins Rash       Medications:     Current  Outpatient Medications:   •  atorvastatin (LIPITOR) 10 mg tablet, Take 10 mg by mouth daily., Disp: , Rfl:   •  BYSTOLIC 5 mg tablet, daily. , Disp: , Rfl: 5  •  diphenhydrAMINE (BENADRYL) 25 mg capsule, Take 25 mg by mouth every 6 (six) hours as needed., Disp: , Rfl:   •  LORazepam (ATIVAN) 0.5 mg tablet, TK 1 T PO Q 8 H PRF ANXIETY, Disp: , Rfl: 3     Past Medical History:   Past Medical History:   Diagnosis Date   • Hypertension        Past Surgical History:   Past Surgical History:   Procedure Laterality Date   • APPENDECTOMY     • COLON SURGERY      Tumor removed   • TONSILLECTOMY     • TUBAL LIGATION         Family History:  Family History   Problem Relation Age of Onset   • Leukemia Biological Father        Social History:  Social History     Socioeconomic History   • Marital status:      Spouse name: Not on file   • Number of children: Not on file   • Years of education: Not on file   • Highest education level: Not on file   Occupational History   • Not on file   Tobacco Use   • Smoking status: Former Smoker     Quit date:      Years since quittin.2   • Smokeless tobacco: Never Used   Substance and Sexual Activity   • Alcohol use: Yes     Alcohol/week: 1.0 standard drink     Types: 1 Glasses of wine per week     Comment: occasionally   • Drug use: Never   • Sexual activity: Defer   Other Topics Concern   • Not on file   Social History Narrative   • Not on file     Social Determinants of Health     Financial Resource Strain: Not on file   Food Insecurity: Not on file   Transportation Needs: Not on file   Physical Activity: Not on file   Stress: Not on file   Social Connections: Not on file   Intimate Partner Violence: Not on file   Housing Stability: Not on file       Vitals:   Vitals:    22 0928   BP: (!) 160/100   Pulse: 69   Resp: 20   Temp: (!) 35.6 °C (96.1 °F)   SpO2: 97%       Review of Systems:  A complete 14 point review of systems was conducted and was deemed  negative except what was documented in the HPI.    Physical Examination:  Physical Exam   Constitutional: Oriented to person, place, and time. Appears well-developed and well-nourished.   Head: Normocephalic and atraumatic.   Right Ear: External ear normal.   Left Ear: External ear normal.   Nose: Nose normal.   Mouth/Throat: Oropharynx is clear and moist.   Eyes: Conjunctivae and EOM are normal. Pupils are equal, round, and reactive to light. No scleral icterus.   Neck: Normal range of motion.   Cardiovascular: Normal rate.    Pulmonary/Chest: No respiratory distress.   Abdominal: Soft. Exhibits no distension.  Musculoskeletal: Normal range of motion. No edema or tenderness.   Neurological: Oriented to person, place, and time.   Skin: Skin is warm and dry. No rash noted. No erythema.   Psychiatric: Normal mood and affect. Speech is normal and behavior is normal. Thought content normal.     Vitals reviewed.    Neurological Examination:  Neurologic Exam     Mental Status   Oriented to person, place, and time.   Attention: normal.   Speech: speech is normal   Level of consciousness: alert    Cranial Nerves     CN II: Visual fields are full to confrontation.  Pupils are equal and briskly reactive to light.   CN III, IV, VI: Extra-occular muscles are intact  CN V: Facial sensation is intact and equal in V1-V3 distributions bilaterally.   CN VII: Face is symmetric with normal eye closure and smile.  CN VIII: Hearing is normal to rubbing fingers  CN IX, X: Palate elevates symmetrically. Phonation is normal.  CN XI: Head turning and shoulder shrug are intact  CN XII: Tongue is midline with normal movements and no atrophy.    Sensation ( /2)    Right Left  Right Left   C5 2 2 L2 2 2   C6 2 2 L3 2 2   C7 2 2 L4 2 2   C8 2 2 L5 2 2   T1 2 2 S1 2 2     Motor:     Deltoid Biceps Triceps Wrist ext Finger ext Hand Intrinsics Hip flexion Knee ext Dorsi-  flexion EHL Plantar Flexion   R 5 5 5 5 5 5 5 5 5 5 5   L 5 5 5 5 5 5 5 5 5 5  5     There is no pronator drift. Muscle bulk and tone are normal.     Gait, Coordination, and Reflexes     Reflexes   Reflexes 2+ except as noted.   Right Duran: absent  Left Duran: absent  Right ankle clonus: absent  Left ankle clonus: absent      Data Review:    Lab Results:   No results found for: WBC, HGB, HCT, MCV, PLT, RDW   No results found for: GLUCOSE, GLUC, CREAT, BUN, NA, K, CL, CO2, ANIONGAP, CA     Imaging:   Independent review of all imaging was done by myself as well as review of the radiologists readings and comparison to prior films.       MRI Brain 1/22/21:  Right posterior fossa extra-axial mass mildly increased from scan in 2019        MRI BRAIN 2/28/22  1.  No focal acute intracranial abnormality.  2. Enhancing extra-axial mass in the right posterior fossa most likely  representing meningioma mild increase in size  3. A second enhancing extra-axial lesion posterior to the left temporal bone  also likely representing meningioma. This has also mildly increased in size.        Assessment / Plan:     In summary, Karin Salas is a 76 y.o. female seen today for evaluation of right sided posterior fossa meningioma.  Patient has previously been managed with routine surveillance imaging for the lesion and is feeling well at her visit today.  She HAs remained asymptomatic.  There is a report from the MRI from 3/23/2009 which states at that time the mass measured approximately 1 cm in its greatest dimension.  In 2018 it measured approximately 2.5 x 2.4 x 2 cm and in 2021 measured at 2.7 x 2.6 x 2.1 cm.  The recent imaging from 2/28/22 has the lesion measuring 2.9 x 2.8 x 2.1 cm. While the lesion is near the transverse sigmoid junction it does not appear to directly occlude either. There is no significant edema.  Nor any significant regional mass-effect.    Given the benign appearing nature of the meningioma, lack of referable symptoms presently, and the patient's age, surveillance will be continued  as opposed to surgical intervention. If the rate of growth accelerates or if she becomes symptomatic patient may require treatment.  Patient will have a repeat MRI of the brain in 1 year will follow up appointment. She was instructed to call the office in the meantime should she become symptomatic, and a list of concerning symptoms was reviewed with the patient. Patient agrees, and all questions were answered.        25 minutes were spent with the patient on examination, review of past medical history, and prior imaging, and coordinating care.  Patient seen earlier today. Signature timestamp does not reflect patient encounter time.   More than 50% of the time is spent counseling the patient and family and coordinating care.

## 2022-03-04 NOTE — LETTER
March 15, 2022     Glenn Velazquez, DO  [address]  [facility] [address]    Patient: Karin Salas  YOB: 1945  Date of Visit: 3/4/2022      Dear Dr. Velazquez:    Thank you for referring Karin Salas to me for evaluation. Below are my notes for this consultation.    If you have questions, please do not hesitate to call me. I look forward to following your patient along with you.         Sincerely,        Taj Rosado MD        CC: Taj Dominguez MD  3/15/2022 11:36 AM  Signed              [facility]  Medical Office Building [address], [address]  [place] [address]  Phone: [phone]  Fax: [phone]      Patient ID: Karin Salas                              : 1945    Visit Date: 3/4/2022    Chief Complaint / History of Present Illness:   Karin Salas is a pleasant 76 y.o. female history of meningioma, colon CA s/p resection in ,  Vertigo, HTN and anxiety seen today for meningioma followup.    Her meningioma was first discovered on imaging in  however the patient was told about it after she presented to her PCP with concern for a slight mass on the right side of her upper neck.  Due to her oncologic history it was recommended she undergo an MRI of the brain which demonstrated the presence of a lesion within the posterior fossa.  Back in  the patient had vertigo associated with the medication but underwent imaging at that time as well.  The patient says she was never made aware of the findings but the imaging in  appears to have demonstrated this lesion as well.  She saw Dr. Valencia from Neurosurgery at  in  who recommended monitoring as she was asymptomatic.  She returned our office in  who repeated MRI which was stable and again recommended surveillance with plan for repeat MRI in 1 year. MRI in  revealed slight, 1-2mm growth. Surveillance was again recommended.    She presents today after  undergoing MRI brain on 22 which showed slight increase in the lesion compared to imaging in . Today she reports she is feeling well and is without complaint.  She denies neck pain, vision changes, facial weakness/numbness, headache, gait imbalance, hearing changes, focal UE/LE numbness/weakness, changes in swallowing, speech changes.        Allergies:  Allergies   Allergen Reactions   • Nsaids (Non-Steroidal Anti-Inflammatory Drug) Other (see comments)     Vertigo   • Penicillins Rash       Medications:     Current Outpatient Medications:   •  atorvastatin (LIPITOR) 10 mg tablet, Take 10 mg by mouth daily., Disp: , Rfl:   •  BYSTOLIC 5 mg tablet, daily. , Disp: , Rfl: 5  •  diphenhydrAMINE (BENADRYL) 25 mg capsule, Take 25 mg by mouth every 6 (six) hours as needed., Disp: , Rfl:   •  LORazepam (ATIVAN) 0.5 mg tablet, TK 1 T PO Q 8 H PRF ANXIETY, Disp: , Rfl: 3     Past Medical History:   Past Medical History:   Diagnosis Date   • Hypertension        Past Surgical History:   Past Surgical History:   Procedure Laterality Date   • APPENDECTOMY     • COLON SURGERY      Tumor removed   • TONSILLECTOMY     • TUBAL LIGATION         Family History:  Family History   Problem Relation Age of Onset   • Leukemia Biological Father        Social History:  Social History     Socioeconomic History   • Marital status:      Spouse name: Not on file   • Number of children: Not on file   • Years of education: Not on file   • Highest education level: Not on file   Occupational History   • Not on file   Tobacco Use   • Smoking status: Former Smoker     Quit date:      Years since quittin.2   • Smokeless tobacco: Never Used   Substance and Sexual Activity   • Alcohol use: Yes     Alcohol/week: 1.0 standard drink     Types: 1 Glasses of wine per week     Comment: occasionally   • Drug use: Never   • Sexual activity: Defer   Other Topics Concern   • Not on file   Social History Narrative   • Not on  file     Social Determinants of Health     Financial Resource Strain: Not on file   Food Insecurity: Not on file   Transportation Needs: Not on file   Physical Activity: Not on file   Stress: Not on file   Social Connections: Not on file   Intimate Partner Violence: Not on file   Housing Stability: Not on file       Vitals:   Vitals:    03/04/22 0928   BP: (!) 160/100   Pulse: 69   Resp: 20   Temp: (!) 35.6 °C (96.1 °F)   SpO2: 97%       Review of Systems:  A complete 14 point review of systems was conducted and was deemed negative except what was documented in the HPI.    Physical Examination:  Physical Exam   Constitutional: Oriented to person, place, and time. Appears well-developed and well-nourished.   Head: Normocephalic and atraumatic.   Right Ear: External ear normal.   Left Ear: External ear normal.   Nose: Nose normal.   Mouth/Throat: Oropharynx is clear and moist.   Eyes: Conjunctivae and EOM are normal. Pupils are equal, round, and reactive to light. No scleral icterus.   Neck: Normal range of motion.   Cardiovascular: Normal rate.    Pulmonary/Chest: No respiratory distress.   Abdominal: Soft. Exhibits no distension.  Musculoskeletal: Normal range of motion. No edema or tenderness.   Neurological: Oriented to person, place, and time.   Skin: Skin is warm and dry. No rash noted. No erythema.   Psychiatric: Normal mood and affect. Speech is normal and behavior is normal. Thought content normal.     Vitals reviewed.    Neurological Examination:  Neurologic Exam     Mental Status   Oriented to person, place, and time.   Attention: normal.   Speech: speech is normal   Level of consciousness: alert    Cranial Nerves     CN II: Visual fields are full to confrontation.  Pupils are equal and briskly reactive to light.   CN III, IV, VI: Extra-occular muscles are intact  CN V: Facial sensation is intact and equal in V1-V3 distributions bilaterally.   CN VII: Face is symmetric with normal eye closure and smile.  CN  VIII: Hearing is normal to rubbing fingers  CN IX, X: Palate elevates symmetrically. Phonation is normal.  CN XI: Head turning and shoulder shrug are intact  CN XII: Tongue is midline with normal movements and no atrophy.    Sensation ( /2)    Right Left  Right Left   C5 2 2 L2 2 2   C6 2 2 L3 2 2   C7 2 2 L4 2 2   C8 2 2 L5 2 2   T1 2 2 S1 2 2     Motor:     Deltoid Biceps Triceps Wrist ext Finger ext Hand Intrinsics Hip flexion Knee ext Dorsi-  flexion EHL Plantar Flexion   R 5 5 5 5 5 5 5 5 5 5 5   L 5 5 5 5 5 5 5 5 5 5 5     There is no pronator drift. Muscle bulk and tone are normal.     Gait, Coordination, and Reflexes     Reflexes   Reflexes 2+ except as noted.   Right Duran: absent  Left Duran: absent  Right ankle clonus: absent  Left ankle clonus: absent      Data Review:    Lab Results:   No results found for: WBC, HGB, HCT, MCV, PLT, RDW   No results found for: GLUCOSE, GLUC, CREAT, BUN, NA, K, CL, CO2, ANIONGAP, CA     Imaging:   Independent review of all imaging was done by myself as well as review of the radiologists readings and comparison to prior films.       MRI Brain 1/22/21:  Right posterior fossa extra-axial mass mildly increased from scan in 2019        MRI BRAIN 2/28/22  1.  No focal acute intracranial abnormality.  2. Enhancing extra-axial mass in the right posterior fossa most likely  representing meningioma mild increase in size  3. A second enhancing extra-axial lesion posterior to the left temporal bone  also likely representing meningioma. This has also mildly increased in size.        Assessment / Plan:     In summary, Karin Salas is a 76 y.o. female seen today for evaluation of right sided posterior fossa meningioma.  Patient has previously been managed with routine surveillance imaging for the lesion and is feeling well at her visit today.  She HAs remained asymptomatic.  There is a report from the MRI from 3/23/2009 which states at that time the mass measured approximately 1  cm in its greatest dimension.  In 2018 it measured approximately 2.5 x 2.4 x 2 cm and in 2021 measured at 2.7 x 2.6 x 2.1 cm.  The recent imaging from 2/28/22 has the lesion measuring 2.9 x 2.8 x 2.1 cm. While the lesion is near the transverse sigmoid junction it does not appear to directly occlude either. There is no significant edema.  Nor any significant regional mass-effect.    Given the benign appearing nature of the meningioma, lack of referable symptoms presently, and the patient's age, surveillance will be continued as opposed to surgical intervention. If the rate of growth accelerates or if she becomes symptomatic patient may require treatment.  Patient will have a repeat MRI of the brain in 1 year will follow up appointment. She was instructed to call the office in the meantime should she become symptomatic, and a list of concerning symptoms was reviewed with the patient. Patient agrees, and all questions were answered.        25 minutes were spent with the patient on examination, review of past medical history, and prior imaging, and coordinating care.  Patient seen earlier today. Signature timestamp does not reflect patient encounter time.   More than 50% of the time is spent counseling the patient and family and coordinating care.

## 2022-07-22 ENCOUNTER — APPOINTMENT (EMERGENCY)
Dept: RADIOLOGY | Facility: HOSPITAL | Age: 77
DRG: 287 | End: 2022-07-22
Attending: EMERGENCY MEDICINE
Payer: MEDICARE

## 2022-07-22 ENCOUNTER — HOSPITAL ENCOUNTER (INPATIENT)
Facility: HOSPITAL | Age: 77
LOS: 2 days | Discharge: HOME | DRG: 287 | End: 2022-07-25
Attending: EMERGENCY MEDICINE | Admitting: INTERNAL MEDICINE
Payer: MEDICARE

## 2022-07-22 DIAGNOSIS — R06.09 DYSPNEA ON EXERTION: Primary | ICD-10-CM

## 2022-07-22 DIAGNOSIS — R07.9 CHEST PAIN, UNSPECIFIED TYPE: ICD-10-CM

## 2022-07-22 DIAGNOSIS — R06.09 DOE (DYSPNEA ON EXERTION): ICD-10-CM

## 2022-07-22 PROBLEM — I10 HTN (HYPERTENSION): Status: ACTIVE | Noted: 2022-07-22

## 2022-07-22 PROBLEM — E78.5 HYPERLIPIDEMIA: Status: ACTIVE | Noted: 2022-07-22

## 2022-07-22 LAB
ALBUMIN SERPL-MCNC: 4.6 G/DL (ref 3.4–5)
ALP SERPL-CCNC: 80 IU/L (ref 35–126)
ALT SERPL-CCNC: 35 IU/L (ref 11–54)
ANION GAP SERPL CALC-SCNC: 8 MEQ/L (ref 3–15)
AST SERPL-CCNC: 28 IU/L (ref 15–41)
BASOPHILS # BLD: 0.05 K/UL (ref 0.01–0.1)
BASOPHILS NFR BLD: 0.7 %
BILIRUB SERPL-MCNC: 0.7 MG/DL (ref 0.3–1.2)
BUN SERPL-MCNC: 18 MG/DL (ref 8–20)
CALCIUM SERPL-MCNC: 9.2 MG/DL (ref 8.9–10.3)
CHLORIDE SERPL-SCNC: 105 MEQ/L (ref 98–109)
CO2 SERPL-SCNC: 22 MEQ/L (ref 22–32)
CREAT SERPL-MCNC: 0.8 MG/DL (ref 0.6–1.1)
DIFFERENTIAL METHOD BLD: NORMAL
EOSINOPHIL # BLD: 0.04 K/UL (ref 0.04–0.36)
EOSINOPHIL NFR BLD: 0.5 %
ERYTHROCYTE [DISTWIDTH] IN BLOOD BY AUTOMATED COUNT: 11.9 % (ref 11.7–14.4)
GFR SERPL CREATININE-BSD FRML MDRD: >60 ML/MIN/1.73M*2
GLUCOSE SERPL-MCNC: 108 MG/DL (ref 70–99)
HCT VFR BLDCO AUTO: 41.5 % (ref 35–45)
HGB BLD-MCNC: 14.3 G/DL (ref 11.8–15.7)
IMM GRANULOCYTES # BLD AUTO: 0.01 K/UL (ref 0–0.08)
IMM GRANULOCYTES NFR BLD AUTO: 0.1 %
LYMPHOCYTES # BLD: 1.45 K/UL (ref 1.2–3.5)
LYMPHOCYTES NFR BLD: 19 %
MCH RBC QN AUTO: 32.3 PG (ref 28–33.2)
MCHC RBC AUTO-ENTMCNC: 34.5 G/DL (ref 32.2–35.5)
MCV RBC AUTO: 93.7 FL (ref 83–98)
MONOCYTES # BLD: 0.55 K/UL (ref 0.28–0.8)
MONOCYTES NFR BLD: 7.2 %
NEUTROPHILS # BLD: 5.55 K/UL (ref 1.7–7)
NEUTS SEG NFR BLD: 72.5 %
NRBC BLD-RTO: 0 %
PDW BLD AUTO: 10.2 FL (ref 9.4–12.3)
PLATELET # BLD AUTO: 272 K/UL (ref 150–369)
POTASSIUM SERPL-SCNC: 4.1 MEQ/L (ref 3.6–5.1)
PROT SERPL-MCNC: 7.6 G/DL (ref 6–8.2)
RBC # BLD AUTO: 4.43 M/UL (ref 3.93–5.22)
SARS-COV-2 RNA RESP QL NAA+PROBE: NEGATIVE
SODIUM SERPL-SCNC: 135 MEQ/L (ref 136–144)
TROPONIN I SERPL HS-MCNC: 3.3 PG/ML
TROPONIN I SERPL HS-MCNC: 3.8 PG/ML
WBC # BLD AUTO: 7.65 K/UL (ref 3.8–10.5)

## 2022-07-22 PROCEDURE — U0002 COVID-19 LAB TEST NON-CDC: HCPCS | Performed by: EMERGENCY MEDICINE

## 2022-07-22 PROCEDURE — 63700000 HC SELF-ADMINISTRABLE DRUG: Performed by: PHYSICIAN ASSISTANT

## 2022-07-22 PROCEDURE — G0378 HOSPITAL OBSERVATION PER HR: HCPCS

## 2022-07-22 PROCEDURE — 93005 ELECTROCARDIOGRAM TRACING: CPT

## 2022-07-22 PROCEDURE — 93005 ELECTROCARDIOGRAM TRACING: CPT | Performed by: EMERGENCY MEDICINE

## 2022-07-22 PROCEDURE — 96374 THER/PROPH/DIAG INJ IV PUSH: CPT

## 2022-07-22 PROCEDURE — 63600000 HC DRUGS/DETAIL CODE: Performed by: PHYSICIAN ASSISTANT

## 2022-07-22 PROCEDURE — 99284 EMERGENCY DEPT VISIT MOD MDM: CPT | Mod: 25

## 2022-07-22 PROCEDURE — 84484 ASSAY OF TROPONIN QUANT: CPT | Performed by: EMERGENCY MEDICINE

## 2022-07-22 PROCEDURE — 63700000 HC SELF-ADMINISTRABLE DRUG: Performed by: NURSE PRACTITIONER

## 2022-07-22 PROCEDURE — 71046 X-RAY EXAM CHEST 2 VIEWS: CPT

## 2022-07-22 PROCEDURE — 99219 PR INITIAL OBSERVATION CARE/DAY 50 MINUTES: CPT | Performed by: HOSPITALIST

## 2022-07-22 PROCEDURE — 85025 COMPLETE CBC W/AUTO DIFF WBC: CPT

## 2022-07-22 PROCEDURE — 36415 COLL VENOUS BLD VENIPUNCTURE: CPT

## 2022-07-22 PROCEDURE — 80053 COMPREHEN METABOLIC PANEL: CPT | Performed by: EMERGENCY MEDICINE

## 2022-07-22 PROCEDURE — 84484 ASSAY OF TROPONIN QUANT: CPT | Mod: 91 | Performed by: EMERGENCY MEDICINE

## 2022-07-22 PROCEDURE — 85025 COMPLETE CBC W/AUTO DIFF WBC: CPT | Performed by: EMERGENCY MEDICINE

## 2022-07-22 RX ORDER — NITROGLYCERIN 0.4 MG/1
0.4 TABLET SUBLINGUAL EVERY 5 MIN PRN
Status: DISCONTINUED | OUTPATIENT
Start: 2022-07-22 | End: 2022-07-25 | Stop reason: HOSPADM

## 2022-07-22 RX ORDER — ATROPINE SULFATE 0.1 MG/ML
0.5 INJECTION INTRAVENOUS EVERY 5 MIN PRN
Status: DISCONTINUED | OUTPATIENT
Start: 2022-07-22 | End: 2022-07-25

## 2022-07-22 RX ORDER — ATORVASTATIN CALCIUM 10 MG/1
10 TABLET, FILM COATED ORAL EVERY MORNING
Status: DISCONTINUED | OUTPATIENT
Start: 2022-07-23 | End: 2022-07-22

## 2022-07-22 RX ORDER — LORAZEPAM 0.5 MG/1
0.5 TABLET ORAL EVERY 8 HOURS PRN
COMMUNITY
End: 2024-02-01

## 2022-07-22 RX ORDER — NEBIVOLOL 5 MG/1
5 TABLET ORAL NIGHTLY
Status: DISCONTINUED | OUTPATIENT
Start: 2022-07-22 | End: 2022-07-22

## 2022-07-22 RX ORDER — NEBIVOLOL 5 MG/1
5 TABLET ORAL NIGHTLY
Status: DISCONTINUED | OUTPATIENT
Start: 2022-07-22 | End: 2022-07-25 | Stop reason: HOSPADM

## 2022-07-22 RX ORDER — NITROGLYCERIN 0.4 MG/1
0.4 TABLET SUBLINGUAL EVERY 5 MIN PRN
COMMUNITY
End: 2023-02-23

## 2022-07-22 RX ORDER — ACETAMINOPHEN 500 MG
2000 TABLET ORAL EVERY MORNING
COMMUNITY
End: 2024-02-01

## 2022-07-22 RX ORDER — LORAZEPAM 0.5 MG/1
0.5 TABLET ORAL EVERY 8 HOURS PRN
Status: DISCONTINUED | OUTPATIENT
Start: 2022-07-22 | End: 2022-07-25 | Stop reason: HOSPADM

## 2022-07-22 RX ORDER — NEBIVOLOL 5 MG/1
5 TABLET ORAL NIGHTLY
COMMUNITY

## 2022-07-22 RX ORDER — CALCIUM CARBONATE 500(1250)
1 TABLET ORAL EVERY MORNING
COMMUNITY
End: 2023-02-23

## 2022-07-22 RX ORDER — POTASSIUM CHLORIDE 750 MG/1
20 TABLET, EXTENDED RELEASE ORAL AS NEEDED
Status: DISCONTINUED | OUTPATIENT
Start: 2022-07-22 | End: 2022-07-25 | Stop reason: HOSPADM

## 2022-07-22 RX ORDER — DEXTROSE 50 % IN WATER (D50W) INTRAVENOUS SYRINGE
25 AS NEEDED
Status: DISCONTINUED | OUTPATIENT
Start: 2022-07-22 | End: 2022-07-25 | Stop reason: HOSPADM

## 2022-07-22 RX ORDER — ATORVASTATIN CALCIUM 10 MG/1
10 TABLET, FILM COATED ORAL DAILY
Status: DISCONTINUED | OUTPATIENT
Start: 2022-07-23 | End: 2022-07-25 | Stop reason: HOSPADM

## 2022-07-22 RX ORDER — DEXTROSE 40 %
15-30 GEL (GRAM) ORAL AS NEEDED
Status: DISCONTINUED | OUTPATIENT
Start: 2022-07-22 | End: 2022-07-25 | Stop reason: HOSPADM

## 2022-07-22 RX ORDER — IBUPROFEN 200 MG
16-32 TABLET ORAL AS NEEDED
Status: DISCONTINUED | OUTPATIENT
Start: 2022-07-22 | End: 2022-07-25 | Stop reason: HOSPADM

## 2022-07-22 RX ORDER — HYDRALAZINE HYDROCHLORIDE 20 MG/ML
10 INJECTION INTRAMUSCULAR; INTRAVENOUS ONCE
Status: COMPLETED | OUTPATIENT
Start: 2022-07-22 | End: 2022-07-22

## 2022-07-22 RX ORDER — POTASSIUM CHLORIDE 750 MG/1
40 TABLET, EXTENDED RELEASE ORAL AS NEEDED
Status: DISCONTINUED | OUTPATIENT
Start: 2022-07-22 | End: 2022-07-25 | Stop reason: HOSPADM

## 2022-07-22 RX ORDER — ACETAMINOPHEN 325 MG/1
650 TABLET ORAL EVERY 4 HOURS PRN
Status: DISCONTINUED | OUTPATIENT
Start: 2022-07-22 | End: 2022-07-25

## 2022-07-22 RX ORDER — NAPROXEN SODIUM 220 MG/1
243 TABLET, FILM COATED ORAL ONCE
Status: COMPLETED | OUTPATIENT
Start: 2022-07-22 | End: 2022-07-22

## 2022-07-22 RX ADMIN — ASPIRIN 81 MG CHEWABLE TABLET 243 MG: 81 TABLET CHEWABLE at 14:41

## 2022-07-22 RX ADMIN — NEBIVOLOL 5 MG: 5 TABLET ORAL at 21:51

## 2022-07-22 RX ADMIN — HYDRALAZINE HYDROCHLORIDE 5 MG: 20 INJECTION INTRAMUSCULAR; INTRAVENOUS at 15:32

## 2022-07-22 ASSESSMENT — ENCOUNTER SYMPTOMS
PALPITATIONS: 0
TROUBLE SWALLOWING: 0
DYSURIA: 0
FACIAL SWELLING: 0
COUGH: 0
CONSTITUTIONAL NEGATIVE: 1
EYES NEGATIVE: 1
DIAPHORESIS: 0
CHILLS: 0
GASTROINTESTINAL NEGATIVE: 1
NECK PAIN: 0
WOUND: 0
HEADACHES: 0
BACK PAIN: 0
LIGHT-HEADEDNESS: 0
MUSCULOSKELETAL NEGATIVE: 1
PSYCHIATRIC NEGATIVE: 1
FEVER: 0
VOMITING: 0
NEUROLOGICAL NEGATIVE: 1
NECK STIFFNESS: 0
FATIGUE: 0
DIZZINESS: 0
FLANK PAIN: 0
CHEST TIGHTNESS: 1
NAUSEA: 0
WEAKNESS: 0
ABDOMINAL PAIN: 0
SHORTNESS OF BREATH: 1
HEMATURIA: 0

## 2022-07-22 NOTE — Clinical Note
The bilateral groins and right radial was clipped, marked and prepped with ChloraPrep. The patient was draped in a sterile fashion after allowing for the recommended dry time.

## 2022-07-22 NOTE — CONSULTS
REASON FOR CONSULT: unstable angina    CONSULT FROM: Damian Zimmerman DO    PRIMARY CARDIOLOGIST: none    ------------------------------------------------------------------------------------------------------------------------------------------  HISTORY OF PRESENTING ILLNESS  ------------------------------------------------------------------------------------------------------------------------------------------  Karin Salas is a 77 y.o. female who is admitted due to episodes of dyspnea. She states that she began for feel fatigued, in addition to having GI upset a couple of months ago. Last week she was walking off the beach and felt as though she could not breath. She was seen at [facility] in [place]. She was seen by Cardiology, having negative TnI, a NST that showed a small reversible defect, and a subsequent Coronary Ca Score which was zero. Her CXR showed no acute cardiopulmonary process. Her D-dimer was unremarkable. She states that she was supposed to have an Echo completed, however this was never done prior to her discharge. She was told to follow up with a cardiologist upon discharge. She continues with a feeling of though she can't catch her breath at times. She felt as though she could not wait to be scheduled in the office to be seen, so presents to the hospital today.   She is seen resting in bed in no distress. She denies any episodes of CP, palpitations, syncope.   Of note she has known meningiomas that have grown slightly recently. She was started on Zoloft, however stopped this due to side effects.     # cardiac risk factors: age, HTN, HLD  # no CAD/CHF  # colon CA: s/p resection and chemo  # meningioma x2  # anxiety       NST 7/2022 small reversible defect   Coronary Ca Score: 7/2022 - 0      At baseline, the patient denies exertional chest pain, shortness of breath, orthopnea, PND, ankle edema, palpitations, or syncope.    The patient is now admitted with ongoing complaints of  exertional dyspnea and a sense of not being able to catch her breath.     ------------------------------------------------------------------------------------------------------------------------------------------  PAST MEDICAL HISTORY  ------------------------------------------------------------------------------------------------------------------------------------------  Past Medical History:   Diagnosis Date   • Anxiety    • Hypertension      Past Surgical History:   Procedure Laterality Date   • APPENDECTOMY  1957   • COLON SURGERY  2014    Tumor removed   • TONSILLECTOMY  1967   • TUBAL LIGATION  1978       ------------------------------------------------------------------------------------------------------------------------------------------  MEDICATIONS  ------------------------------------------------------------------------------------------------------------------------------------------  Home medications    Not in a hospital admission.    Inpatient Medications    •  aspirin, 243 mg, oral, Once  •  atorvastatin  •  BYSTOLIC  •  diphenhydrAMINE  •  LORazepam    ------------------------------------------------------------------------------------------------------------------------------------------  ALLERGIES  ------------------------------------------------------------------------------------------------------------------------------------------  Nsaids (non-steroidal anti-inflammatory drug) and Penicillins    ------------------------------------------------------------------------------------------------------------------------------------------  SOCIAL HISTORY  ------------------------------------------------------------------------------------------------------------------------------------------  No smoking or alcohol    ------------------------------------------------------------------------------------------------------------------------------------------  FAMILY  HISTORY  ------------------------------------------------------------------------------------------------------------------------------------------  No premature CAD    ------------------------------------------------------------------------------------------------------------------------------------------  REVIEW OF SYSTEMS  ------------------------------------------------------------------------------------------------------------------------------------------  Constitutional: - fever, - chills, - weakness, - weight loss. +fatigue  HEENT: - blurred vision, - sore throat, - hoarseness  Respiratory: - dyspnea, - cough, - hemoptysis  Cardiovascular: - chest pain, + dyspnea, - orthopnea, - PND, - edema, - palpitations, - syncope  Gastrointestinal: - nausea, - vomiting, - diarrhea, - hematemesis, - melena  Genitourinary: - dysuria, - frequency  Integument: - rash, - itching  Hematologic/lymphatic:  - bruising, - petechiae  Musculoskeletal: - arthalgias, - myalgias  Neurological: - vertigo, - tremors, - headache, - speech deficit, - focal weakness  Behavioral/Psych: - anxiety, - depression  Endocrine: - cold intolerance, - heat intolerance, - weight change    ------------------------------------------------------------------------------------------------------------------------------------------  PHYSICAL EXAM  ------------------------------------------------------------------------------------------------------------------------------------------  VITAL SIGNS:  Temp:  [36.8 °C (98.3 °F)] 36.8 °C (98.3 °F)  Heart Rate:  [65] 65  Resp:  [18] 18  BP: (199)/(115) 199/115  SaO2: 97%  No intake or output data in the 24 hours ending 07/22/22 0432    PHYSICAL EXAM:  General appearance: alert and cooperative  Head: without obvious abnormality  Eyes: PERRLA, extraocular movements intact  Neck: No JVD, carotid bruits, thyromegaly  Lungs: clear to auscultation bilaterally, no crackles or wheezing  Heart: regular rate and rhythm,  S1-S2 normal, no murmurs, clicks, rubs or gallops  Abdomen: soft, non-tender, bowel sounds normal  Extremities: no edema, peripheral pulses present  Skin: Skin color, texture, turgor normal. No rashes or lesions  Neurologic: Alert and oriented X 3, no focal deficits    ------------------------------------------------------------------------------------------------------------------------------------------  LABS / IMAGING / EKG / TELEMETRY  ------------------------------------------------------------------------------------------------------------------------------------------  LABS:        No lab exists for component: GLUF,  DIGOXIN  Results from last 7 days   Lab Units 07/22/22  1356   WBC K/uL 7.65   HEMOGLOBIN g/dL 14.3   HEMATOCRIT % 41.5   PLATELETS K/uL 272     No results found for: HGBA1C, TSH  No results found for: CHOL, LDLCALC, HDL, TRIG  No results found for: BNP    IMAGING:  CXR pending    ECG:   NSR 66 inferior, anterior infarct of undetermined age    TELEMETRY:  No events    ------------------------------------------------------------------------------------------------------------------------------------------  ASSESSMENT AND PLAN  ------------------------------------------------------------------------------------------------------------------------------------------  1. Dyspnea  Unclear etiology; r/o for CAD with recent work up at \[facility]\""   Coronary ca score of 0 noted on OSH records  CXR pending  Will plan for Echo and given her ongoing symptoms will plan for cardiac cath on Monday  NPO p MN on Sunday   Consider pulm eval     2. HTN  On Bystolic    3. HLD  On Atorvastatin    4. Fatigue  Unclear etiology.   Noted history of CA, with recently worsening meningioma        POPPY Corona  7/22/2022    Primary Care Doctor: Glenn Velazquez DO

## 2022-07-22 NOTE — ED PROVIDER NOTES
"HPI    Chief Complaint   Patient presents with   • Chest Pain   • Shortness of Breath        HPI   77-year-old female with past medical history significant for hypertension, high cholesterol, anxiety, cerebral meningioma followed by neurology and malignant neoplasm of colon presents for evaluation of chest pressure and shortness of breath that she states began last Friday.  THIS FIRST BEGAN ONE WEEK AGO, SHE WAS AT THE BEACH IN [place] AND WENT TO THE HOSPITAL THERE.  SHE WAS SEEN BY CARDIOLOGY WHO RECOMMENDED STRESS TEST AND TTE. THEY WERE UNABLE TO GET HER ON THE SCHEDULE FOR THE ECHO SO SHE WAS DISCHARGED PRIOR TO RECEIVING THAT, SHE DID HOWEVER HAVE THE STRESS TEST PERFORMED AND STATES \"THEY TOLD ME I LIKELY NEED STENTS AND TO FOLLOW-UP WITH CARDIOLOGY.  SHE SAW HER PRIMARY CARE DOCTOR YESTERDAY AND SCHEDULED AN APPOINTMENT WITH CARDIOLOGY WHICH IS 2 WEEKS FROM NOW, SHE STATES \"I JUST CANNOT WAIT THAT LONG.\"  Patient states it first began suddenly with no specific precipitating event.  She states she was on the beach and suddenly felt short of breath and developed a pressure in the center of her chest.  She states that since that time she has had continued pressure in her chest, she states it fluctuates in intensity but is not coming and going.  She denies any sharp or aching pain and denies any radiation to the back, neck, jaw, down the arm or to the abdomen.  She denies any diaphoresis, nausea or vomiting associated with this.  She states that she feels like she has difficulty catching her breath but also feels very short of breath on exertion.  She denies palpitations or tachycardia, dizziness, headache or syncope.  She is denying any recent fever or chills, cough or sputum production.  Denying any recent travel, long car rides or flights.  Denying any lower extremity pain or swelling.  Did have D-dimer done last week which was negative.  Past Medical History:   Diagnosis Date   • Anxiety    • Hypertension  "         Past Surgical History:   Procedure Laterality Date   • APPENDECTOMY     • COLON SURGERY  2014    Tumor removed   • TONSILLECTOMY     • TUBAL LIGATION         Family History   Problem Relation Age of Onset   • Leukemia Biological Father        Social History     Tobacco Use   • Smoking status: Former Smoker     Quit date:      Years since quittin.5   • Smokeless tobacco: Never Used   Substance Use Topics   • Alcohol use: Yes     Alcohol/week: 1.0 standard drink     Types: 1 Glasses of wine per week     Comment: occasionally   • Drug use: Never       Systems Reviewed from Nursing Triage:                 Review of Systems   Constitutional: Negative.  Negative for chills, diaphoresis, fatigue and fever.   HENT: Negative.  Negative for facial swelling and trouble swallowing.    Eyes: Negative.  Negative for visual disturbance.   Respiratory: Positive for chest tightness and shortness of breath. Negative for cough.    Cardiovascular: Positive for chest pain. Negative for palpitations and leg swelling.   Gastrointestinal: Negative.  Negative for abdominal pain, nausea and vomiting.   Genitourinary: Negative.  Negative for dysuria, flank pain, hematuria and urgency.   Musculoskeletal: Negative.  Negative for back pain, neck pain and neck stiffness.   Skin: Negative.  Negative for rash and wound.   Neurological: Negative.  Negative for dizziness, syncope, weakness, light-headedness and headaches.   Psychiatric/Behavioral: Negative.  Negative for suicidal ideas.            ED Triage Vitals [22 1354]   Temp Heart Rate Resp BP SpO2   36.8 °C (98.3 °F) 65 18 (!) 199/115 97 %      Temp Source Heart Rate Source Patient Position BP Location FiO2 (%) (Set)   Temporal Monitor Lying Right upper arm --       Vitals:    22 1354 22 1532 22 1555 22 1655   BP: (!) 199/115 (!) 182/96 (!) 157/75 (!) 164/85   BP Location: Right upper arm Right upper arm Right upper arm    Patient  "Position: Lying Sitting Sitting    Pulse: 65 60 70 66   Resp: 18 (!) 24 (!) 24 20   Temp: 36.8 °C (98.3 °F)      TempSrc: Temporal      SpO2: 97% 95% 97% 96%   Weight: 59 kg (130 lb)      Height: 1.6 m (5' 3\")                            Physical Exam  Constitutional:       General: She is not in acute distress.     Appearance: Normal appearance. She is not toxic-appearing or diaphoretic.   HENT:      Head: Normocephalic.      Mouth/Throat:      Mouth: Mucous membranes are moist.      Pharynx: Oropharynx is clear.   Eyes:      Conjunctiva/sclera: Conjunctivae normal.      Pupils: Pupils are equal, round, and reactive to light.   Cardiovascular:      Rate and Rhythm: Normal rate.      Pulses: Normal pulses.   Pulmonary:      Effort: Pulmonary effort is normal. No tachypnea or respiratory distress.      Breath sounds: Normal breath sounds.   Abdominal:      Palpations: Abdomen is soft.      Tenderness: There is no abdominal tenderness. There is no guarding.   Musculoskeletal:         General: No swelling or deformity.      Cervical back: Neck supple. No rigidity or tenderness.   Skin:     General: Skin is warm.      Capillary Refill: Capillary refill takes less than 2 seconds.   Neurological:      General: No focal deficit present.      Mental Status: She is alert and oriented to person, place, and time.   Psychiatric:         Behavior: Behavior normal.              X-RAY CHEST 2 VIEWS   Final Result   IMPRESSION:   No radiographic evidence of acute cardiopulmonary disease.         ECG 12 lead    (Results Pending)   Transthoracic echo (TTE) complete    (Results Pending)       Labs Reviewed   COMPREHENSIVE METABOLIC PANEL - Abnormal       Result Value    Sodium 135 (*)     Potassium 4.1      Chloride 105      CO2 22      BUN 18      Creatinine 0.8      Glucose 108 (*)     Calcium 9.2      AST (SGOT) 28      ALT (SGPT) 35      Alkaline Phosphatase 80      Total Protein 7.6      Albumin 4.6      Bilirubin, Total 0.7      " eGFR >60.0      Anion Gap 8     SARS-COV-2 (COVID 19), PCR (PERF) - Normal    SARS-CoV-2 (COVID-19) Negative      Narrative:     Testing performed using real-time PCR for detection of COVID-19. EUA approved validation studies performed on site.    HIGH SENSITIVE TROPONIN I (BASELINE - REFLEX 2HR) - Normal    High Sens Troponin I 3.3     HIGH SENSITIVE TROPONIN I (NO REFLEX) - Normal    High Sens Troponin I 3.8     SARS-COV-2 (COVID 19), PCR    Narrative:     The following orders were created for panel order SARS-CoV-2 (COVID-19), PCR Nasopharynx.  Procedure                               Abnormality         Status                     ---------                               -----------         ------                     SARS-CoV-2 (COVID-19), P...[[id number]]  Normal              Final result                 Please view results for these tests on the individual orders.   CBC AND DIFF    WBC 7.65      RBC 4.43      Hemoglobin 14.3      Hematocrit 41.5      MCV 93.7      MCH 32.3      MCHC 34.5      RDW 11.9      Platelets 272      MPV 10.2      Differential Type Auto      nRBC 0.0      Immature Granulocytes 0.1      Neutrophils 72.5      Lymphocytes 19.0      Monocytes 7.2      Eosinophils 0.5      Basophils 0.7      Immature Granulocytes, Absolute 0.01      Neutrophils, Absolute 5.55      Lymphocytes, Absolute 1.45      Monocytes, Absolute 0.55      Eosinophils, Absolute 0.04      Basophils, Absolute 0.05     RAINBOW DRAW PANEL    Narrative:     The following orders were created for panel order [place] Draw Panel.  Procedure                               Abnormality         Status                     ---------                               -----------         ------                     RAINBOW RED[[id number]]                                       In process                 RAINBOW LT BLUE[[id number]]                                   In process                 RAINBOW GOLD[[id number]]                                      In  process                   Please view results for these tests on the individual orders.   RAINBOW RED   RAINBOW LT BLUE   RAINBOW GOLD       X-RAY CHEST 2 VIEWS   Final Result   IMPRESSION:   No radiographic evidence of acute cardiopulmonary disease.         ECG 12 lead    (Results Pending)   Transthoracic echo (TTE) complete    (Results Pending)         Procedures    Final diagnoses:   [R07.9] Chest pain, unspecified type   [R06.00] MÉNDEZ (dyspnea on exertion)       ED Course & [facility]     ED Course as of 08/15/22 1133   Fri Jul 22, 2022   1428 SPOKE WITH DR. BAEZA FROM CARDIOLOGY, CARDIOLOGY WILL SEE IN CONSULT, PLAN WILL LIKELY BE TO HAVE ECHO DONE TODAY AND CARDIAC CATHETERIZATION ON MONDAY, ADMIT TO [facility] [ES]   Mon Aug 15, 2022   1133 [facility]: 33.0 [RG]      ED Course User Index  [ES] Melanie Montero PA C  [RG] Damian Zimmerman, DO           [facility]  EKG performed at 1349, ventricular rate of 66, WI interval 140, QRS duration 74, QT/QTc 422/442, normal sinus rhythm with nonspecific changes to the inferior leads and anterior leads, reviewed by attending physician.  No previous ECGs seen in the system currently to compare to.    5:41 PM    Impression: Chest pressure with worsening MÉNDEZ x1 week    Plan: ECG, basic labs, cardiac enzymes, chest x-ray, cardiology consult    Vital Signs Review: Vital signs have been reviewed. The oxygen saturation is SpO2: 97 % which is within normal limits.    Abnormal stress test last week, seen by cardiology, recommend echo, admit to [facility], plan for cardiac cath on Monday.  Patient is lying comfortably in bed in no distress at this time.    NORBERTO Del Angel  7/22/2022          This document was created using dragon dictation software.  There might be some typographical errors due to this technology.     Melanie Montero PA C  07/22/22 2400

## 2022-07-23 PROBLEM — R07.9 CHEST PAIN, UNSPECIFIED TYPE: Status: ACTIVE | Noted: 2022-07-23

## 2022-07-23 LAB
CHOLEST SERPL-MCNC: 138 MG/DL
ERYTHROCYTE [DISTWIDTH] IN BLOOD BY AUTOMATED COUNT: 12.2 % (ref 11.7–14.4)
HCT VFR BLDCO AUTO: 41.1 % (ref 35–45)
HDLC SERPL-MCNC: 52 MG/DL
HDLC SERPL: 2.7 {RATIO}
HGB BLD-MCNC: 14 G/DL (ref 11.8–15.7)
LDLC SERPL CALC-MCNC: 64 MG/DL
MCH RBC QN AUTO: 33 PG (ref 28–33.2)
MCHC RBC AUTO-ENTMCNC: 34.1 G/DL (ref 32.2–35.5)
MCV RBC AUTO: 96.9 FL (ref 83–98)
NONHDLC SERPL-MCNC: 86 MG/DL
PDW BLD AUTO: 10.3 FL (ref 9.4–12.3)
PLATELET # BLD AUTO: 240 K/UL (ref 150–369)
RBC # BLD AUTO: 4.24 M/UL (ref 3.93–5.22)
TRIGL SERPL-MCNC: 112 MG/DL (ref 30–149)
WBC # BLD AUTO: 7.75 K/UL (ref 3.8–10.5)

## 2022-07-23 PROCEDURE — 63700000 HC SELF-ADMINISTRABLE DRUG: Performed by: NURSE PRACTITIONER

## 2022-07-23 PROCEDURE — 36415 COLL VENOUS BLD VENIPUNCTURE: CPT | Performed by: NURSE PRACTITIONER

## 2022-07-23 PROCEDURE — 80061 LIPID PANEL: CPT | Performed by: NURSE PRACTITIONER

## 2022-07-23 PROCEDURE — 85027 COMPLETE CBC AUTOMATED: CPT | Performed by: NURSE PRACTITIONER

## 2022-07-23 PROCEDURE — 99232 SBSQ HOSP IP/OBS MODERATE 35: CPT | Performed by: INTERNAL MEDICINE

## 2022-07-23 PROCEDURE — 21400000 HC ROOM AND CARE CCU/INTERMEDIATE

## 2022-07-23 PROCEDURE — G0378 HOSPITAL OBSERVATION PER HR: HCPCS

## 2022-07-23 RX ADMIN — ATORVASTATIN CALCIUM 10 MG: 10 TABLET, FILM COATED ORAL at 09:06

## 2022-07-23 RX ADMIN — LORAZEPAM 0.5 MG: 0.5 TABLET ORAL at 01:55

## 2022-07-23 RX ADMIN — NEBIVOLOL 5 MG: 5 TABLET ORAL at 22:10

## 2022-07-23 RX ADMIN — LORAZEPAM 0.5 MG: 0.5 TABLET ORAL at 22:10

## 2022-07-23 ASSESSMENT — PATIENT HEALTH QUESTIONNAIRE - PHQ9: SUM OF ALL RESPONSES TO PHQ9 QUESTIONS 1 & 2: 0

## 2022-07-23 NOTE — PROGRESS NOTES
SUBJECTIVE      Still with shortness of breath on exertion.    OBJECTIVE     VITAL SIGNS:  Temp:  [36.6 °C (97.9 °F)-36.8 °C (98.3 °F)] 36.7 °C (98 °F)  Heart Rate:  [54-74] 70  Resp:  [18-24] 18  BP: (125-199)/() 125/67  SPO2 95%  No intake or output data in the 24 hours ending 07/23/22 1313    PHYSICAL EXAM:  General appearance: alert and cooperative  Head: without obvious abnormality  Eyes: PERRLA, extraocular movements intact  Neck: No JVD, carotid bruits, thyromegaly  Lungs: clear to auscultation bilaterally, no crackles or wheezing  Heart: regular rate and rhythm, S1-S2 normal, no murmurs, clicks, rubs or gallops  Abdomen: soft, non-tender, bowel sounds normal  Extremities: no edema, peripheral pulses present  Skin: Skin color, texture, turgor normal. No rashes or lesions  Neurologic: Alert and oriented X 3, no focal deficits    LABS / IMAGING / EKG / TELEMETRY     LABS:  Results from last 7 days   Lab Units 07/22/22  1356   SODIUM mEQ/L 135*   POTASSIUM mEQ/L 4.1   CHLORIDE mEQ/L 105   CO2 mEQ/L 22   BUN mg/dL 18   CREATININE mg/dL 0.8   AST IU/L 28   ALT IU/L 35     Results from last 7 days   Lab Units 07/23/22  0633 07/22/22  1356   WBC K/uL 7.75 7.65   HEMOGLOBIN g/dL 14.0 14.3   HEMATOCRIT % 41.1 41.5   PLATELETS K/uL 240 272     No results found for: HGBA1C, TSH  Lab Results   Component Value Date    CHOL 138 07/23/2022    LDLCALC 64 07/23/2022    HDL 52 (L) 07/23/2022    TRIG 112 07/23/2022     No results found for: BNP    IMAGING:      ECG:       TELEMETRY:      MEDICATIONS        • atorvastatin  10 mg oral Daily   • nebivoloL  5 mg oral Nightly       ASSESSMENT AND PLAN     ASSESSMENT AND PLAN:   1.    Dyspnea on exertion: The patient has been noticing this for several weeks to months.  She is concerned about her abnormal stress test and her symptoms.  There is no evidence of heart failure on exam.  She did have a mildly abnormal stress test based on information from [place].   Interestingly enough she also has a calcium score of 0.  We will plan for further cardiac work-up including echocardiogram and will plan for cardiac catheterization on Monday with her continued symptoms and no clear answer.  Would consider pulmonary evaluation as well.   Discussed cardiac catheterization including risk and benefits with patient.  She agrees to for this procedure on Monday.      I note that she has no acute ischemic changes here at this time and her troponin is negative.  I do not see evidence of acute coronary syndrome.       2.  Hypertensive heart disease: Continue Bystolic as before.  Blood pressure elevated here but she may also be anxious.  Can titrate up medicines as necessary.       3.  Mixed hyperlipidemia: Patient is on atorvastatin.  Continue this as before.  Goal LDL is less than 100 and less than 70 if there is evidence of coronary artery disease.         Bandar Burton MD  7/23/2022    Primary Care Doctor: Glenn Velazquez, DO

## 2022-07-23 NOTE — PROGRESS NOTES
Hospital Medicine Service -  Daily Progress Note       SUBJECTIVE   Interval History: c/o.  shortness of breath on exertion.  Not so much at rest.     OBJECTIVE      Vital signs in last 24 hours:  Temp:  [36.4 °C (97.6 °F)-36.7 °C (98 °F)] 36.4 °C (97.6 °F)  Heart Rate:  [54-78] 67  Resp:  [18-24] 18  BP: (125-165)/(67-85) 125/70  No intake or output data in the 24 hours ending 07/23/22 1656    PHYSICAL EXAMINATION      Physical Exam    GENERAL APPEARANCE Awake, alert   MUCUS MEMBRANES Moist   LUNGS CTAB, no w/r/r   CHEST RRR, no m/g/r   ABDOMEN Soft, NT, ND, +BS   EXTREMITIES No c/c/e   SKIN No obvious rash   HEENT Anicteric        LINES, CATHETERS, DRAINS, AIRWAYS, AND WOUNDS   Lines, Drains, and Airways:  Wounds (agree with documentation and present on admission):  Peripheral IV (Adult) 07/22/22 Right Wrist (Active)   Number of days: 1         Comments:      LABS / IMAGING / TELE      Labs  Results from last 7 days   Lab Units 07/23/22  0633   WBC K/uL 7.75   HEMOGLOBIN g/dL 14.0   HEMATOCRIT % 41.1   PLATELETS K/uL 240         Results from last 7 days   Lab Units 07/22/22  1356   SODIUM mEQ/L 135*   POTASSIUM mEQ/L 4.1   CHLORIDE mEQ/L 105   CO2 mEQ/L 22   BUN mg/dL 18   CREATININE mg/dL 0.8   GLUCOSE mg/dL 108*   CALCIUM mg/dL 9.2         SARS-CoV-2 (COVID-19) (no units)   Date/Time Value   07/22/2022 1535 Negative       Imaging      ECG/Telemetry      ASSESSMENT AND PLAN      * Chest pain  Assessment & Plan  Patient presented for evaluation of chest pressure, she denies pain but reports pressure is there since last week when she was at the beach  Currently at rest patient is having any chest pain patient  3.3 with a reflux 3.8  Cardiology isaias -as per cardiology she had mildly abnormal stress test based on information from [place].  They were planning echocardiogram and cardiac cath on Monday.  NPO p MN on Sunday   Pulm consult placed  TTE ordered      HTN (hypertension)  Assessment & Plan  Continue home  medication    Hyperlipidemia  Assessment & Plan  Follow lipid panel in a.m.  Continue home medication    Dyspnea on exertion  Assessment & Plan  Patient reports shortness of breath with exertion, denies shortness of breath at rest  She is not oxygen dependent, pulse ox 95% at rest  Pulmonary consult placed as provided by cardiologist  Continue to monitor       VTE Assessment: Pad[place] VTE Score: 2  VTE Prophylaxis:  Current anticoagulants:    •None      Code Status: Full Code  Palliative Care Screening Score: 1   Estimated Discharge Date: 7/24/2022     Disposition Planning: Home after work up.      Estefania Bird MD  7/23/2022

## 2022-07-23 NOTE — UM PHYSICIAN REVIEW NOTE
78 yo  Women here with recent OP stress test that is negative, plan for echo and cath Care will cross 2MN and inpatient status is appropriate.     Luiz Boateng MD

## 2022-07-23 NOTE — ASSESSMENT & PLAN NOTE
Patient reports shortness of breath with exertion which improves with rest  denies shortness of breath at rest  She is not oxygen dependent, pulse ox 95% at rest  Continue to monitor  cardiac work up negative as above  pulm consult pending

## 2022-07-23 NOTE — ASSESSMENT & PLAN NOTE
Patient presented for evaluation of chest pressure,  Per Cardiology eval - she had mildly abnormal stress test based on information from [place].      TTE results pending  pt had right and left heart cath with normal left and right pressures, patent coronary arteries    pulm consult pending

## 2022-07-23 NOTE — PLAN OF CARE
Problem: Adult Inpatient Plan of Care  Goal: Plan of Care Review  Outcome: Progressing  Flowsheets (Taken 7/23/2022 0241)  Progress: improving  Plan of Care Reviewed With: patient  Outcome Summary: pt admitted yesterday with chest pressure & dyspnea. pt denies any of those symptoms today. vitals stable. resting comfortably.ardiac cath planned for Monday.  Goal: Patient-Specific Goal (Individualized)  Outcome: Progressing  Goal: Absence of Hospital-Acquired Illness or Injury  Outcome: Progressing  Goal: Optimal Comfort and Wellbeing  Outcome: Progressing  Goal: Readiness for Transition of Care  Outcome: Progressing   Plan of Care Review  Plan of Care Reviewed With: patient  Progress: improving  Outcome Summary: pt admitted yesterday with chest pressure & dyspnea. pt denies any of those symptoms today. vitals stable. resting comfortably.ardiac cath planned for Monday.

## 2022-07-23 NOTE — H&P
Hospital Medicine Service -  History & Physical        CHIEF COMPLAINT   Chest pressure     HISTORY OF PRESENT ILLNESS      Karin Salas is a 77 y.o. female with a past medical history of hypertension, hyperlipidemia, colon cancer 9 years ago with in remission presented for evaluation of chest pressure which she has she been experiencing for months recently for a week.  [place] developed this chest pressure nonradiating went into [facility] hospital in [place] after the work-up she was discharged home to follow-up with her cardiologist.  Patient saw her PCP yesterday who suggested patient to seek medical care therefore she is here for evaluation.  Exam patient resting in bed she is awake alert oriented x3 she denies any chest pain, she reports its pressure feeling with exertion accompanied with shortness of breath.  Patient is resting with the pulse ox 95% on room air.  She denies any nausea, vomiting, lightheaded, chest palpitation, dizziness, or syncope.  ED spoke with Dr. BAEZA from cardiologist who recommended cardiac admission on Monday--if symptoms persist admitted to [facility].  Patient was seen in the ER with Dr. Andie Cash--    Heart Score 3  TTE ordered for the morning  Pulmonary consult placed.    CODE STATUS full code daughter Carolynn Roberto is Carolynn Vigil Child     [phone]       PAST MEDICAL AND SURGICAL HISTORY      Past Medical History:   Diagnosis Date   • Anxiety    • Hypertension        Past Surgical History:   Procedure Laterality Date   • APPENDECTOMY  1957   • COLON SURGERY  2014    Tumor removed   • TONSILLECTOMY  1967   • TUBAL LIGATION  1978       PCP: Glenn Velazquez, DO    MEDICATIONS      Prior to Admission medications    Medication Sig Start Date End Date Taking? Authorizing Provider   calcium carbonate (OS-DUSTY) 500 mg calcium (1,250 mg) tablet Take 1 tablet by mouth every morning.   Yes Provider, MD Carlyn   cholecalciferol, vitamin D3, 50 mcg (2,000  unit) capsule Take 2,000 Units by mouth every morning.   Yes Carlyn Forrest MD   LORazepam (ATIVAN) 0.5 mg tablet Take 0.5 mg by mouth every 8 (eight) hours as needed for anxiety or sedation.   Yes Carlyn Forrest MD   nebivoloL (BYSTOLIC) 5 mg tablet Take 5 mg by mouth nightly.   Yes Carlyn Forrest MD   nitroglycerin (NITROSTAT) 0.4 mg SL tablet Place 0.4 mg under the tongue every 5 (five) minutes as needed for chest pain.   Yes Carlyn Forrest MD   atorvastatin (LIPITOR) 10 mg tablet Take 10 mg by mouth every morning.    Carlyn Forrest MD   BYSTOLIC 5 mg tablet 5 mg daily. 18  Carlyn Forrest MD   diphenhydrAMINE (BENADRYL) 25 mg capsule Take 25 mg by mouth every 6 (six) hours as needed.  22  Carlyn Forrest MD   LORazepam (ATIVAN) 0.5 mg tablet TK 1 T PO Q 8 H PRF ANXIETY 3/6/18 7/22/22  Carlyn Forrest MD       ALLERGIES      Nsaids (non-steroidal anti-inflammatory drug) and Penicillins    FAMILY HISTORY      Family History   Problem Relation Age of Onset   • Leukemia Biological Father        SOCIAL HISTORY      Social History     Socioeconomic History   • Marital status:      Spouse name: None   • Number of children: None   • Years of education: None   • Highest education level: None   Tobacco Use   • Smoking status: Former Smoker     Quit date: 1970     Years since quittin.5   • Smokeless tobacco: Never Used   Substance and Sexual Activity   • Alcohol use: Yes     Alcohol/week: 1.0 standard drink     Types: 1 Glasses of wine per week     Comment: occasionally   • Drug use: Never   • Sexual activity: Defer     Social Determinants of Health     Food Insecurity: No Food Insecurity   • Worried About Running Out of Food in the Last Year: Never true   • Ran Out of Food in the Last Year: Never true       REVIEW OF SYSTEMS      All other systems reviewed and negative except as noted in HPI    PHYSICAL EXAMINATION      Temp:  [36.8 °C (98.3  °F)] 36.8 °C (98.3 °F)  Heart Rate:  [60-74] 66  Resp:  [18-24] 24  BP: (147-199)/() 165/81  Body mass index is 23.03 kg/m².    Physical Exam  Constitutional:       Appearance: Normal appearance.   HENT:      Head: Normocephalic and atraumatic.      Right Ear: External ear normal.      Left Ear: External ear normal.      Nose: No congestion or rhinorrhea.      Mouth/Throat:      Mouth: Mucous membranes are moist.      Pharynx: Oropharynx is clear. No oropharyngeal exudate or posterior oropharyngeal erythema.   Eyes:      General:         Right eye: No discharge.         Left eye: No discharge.      Extraocular Movements: Extraocular movements intact.      Conjunctiva/sclera: Conjunctivae normal.      Pupils: Pupils are equal, round, and reactive to light.   Cardiovascular:      Rate and Rhythm: Normal rate and regular rhythm.      Pulses: Normal pulses.      Heart sounds: Normal heart sounds.   Pulmonary:      Effort: Pulmonary effort is normal. No respiratory distress.      Breath sounds: Normal breath sounds. No stridor.   Abdominal:      General: There is no distension.      Palpations: There is no mass.   Musculoskeletal:         General: No swelling or tenderness. Normal range of motion.      Cervical back: Normal range of motion and neck supple. No rigidity or tenderness.   Skin:     General: Skin is warm and dry.      Capillary Refill: Capillary refill takes less than 2 seconds.      Coloration: Skin is not jaundiced or pale.   Neurological:      General: No focal deficit present.      Mental Status: She is alert and oriented to person, place, and time. Mental status is at baseline.      Cranial Nerves: No cranial nerve deficit.      Sensory: No sensory deficit.      Motor: No weakness.   Psychiatric:         Mood and Affect: Mood normal.         Behavior: Behavior normal.         Thought Content: Thought content normal.         Judgment: Judgment normal.         LABS / IMAGING / EKG     "    Labs  Results from last 7 days   Lab Units 07/22/22  1356   WBC K/uL 7.65   HEMOGLOBIN g/dL 14.3   HEMATOCRIT % 41.5   PLATELETS K/uL 272   ;lcbc  Results from last 7 days   Lab Units 07/22/22  1356   SODIUM mEQ/L 135*   POTASSIUM mEQ/L 4.1   CHLORIDE mEQ/L 105   CO2 mEQ/L 22   BUN mg/dL 18   CREATININE mg/dL 0.8   CALCIUM mg/dL 9.2   ALBUMIN g/dL 4.6   BILIRUBIN TOTAL mg/dL 0.7   ALK PHOS IU/L 80   ALT IU/L 35   AST IU/L 28   GLUCOSE mg/dL 108*       Imaging  X-RAY CHEST 2 VIEWS   Final Result   IMPRESSION:   No radiographic evidence of acute cardiopulmonary disease.         ECG 12 lead    (Results Pending)   Transthoracic echo (TTE) complete    (Results Pending)       SARS-CoV-2 (COVID-19) (no units)   Date/Time Value   07/22/2022 1535 Negative       ECG/Telemetry  NSr    ASSESSMENT AND PLAN           * Chest pain  Assessment & Plan  Patient presented for evaluation of chest pressure, she denies pain but reports pressure is there since last week when she was at the beach  Currently at rest patient is having any chest pain patient  3.3 with a reflux 3.8  Cardiology eval Dr. Xavier at bedside suggested  \"Will plan for Echo and given her ongoing symptoms will plan for cardiac cath on Monday  NPO p MN on Sunday   Consider pulm eval \".  Pulm consult placed  TTE ordered      HTN (hypertension)  Assessment & Plan  Continue home medication    Hyperlipidemia  Assessment & Plan  Follow lipid panel in a.m.  Continue home medication    Dyspnea on exertion  Assessment & Plan  Patient reports shortness of breath with exertion, denies shortness of breath at rest  She is not oxygen dependent, pulse ox 95% at rest  Pulmonary consult placed as provided by cardiologist  Continue to monitor       VTE Assessment: Padua VTE Score: 2  VTE Prophylaxis: SCD  Code Status: Full Code  Palliative Care Screening Score: 1   Discussed advanced care planning.   Estimated Discharge Date: 7/24/2022  Disposition Planning: Pending hospital " evaluation     POPPY Genao  7/22/2022

## 2022-07-24 LAB
ATRIAL RATE: 66
B-HCG UR QL: NEGATIVE
P AXIS: 0
PR INTERVAL: 140
QRS DURATION: 74
QT INTERVAL: 422
QTC CALCULATION(BAZETT): 442
R AXIS: -17
T WAVE AXIS: 15
VENTRICULAR RATE: 66

## 2022-07-24 PROCEDURE — 63700000 HC SELF-ADMINISTRABLE DRUG: Performed by: NURSE PRACTITIONER

## 2022-07-24 PROCEDURE — 21400000 HC ROOM AND CARE CCU/INTERMEDIATE

## 2022-07-24 PROCEDURE — 63700000 HC SELF-ADMINISTRABLE DRUG: Performed by: INTERNAL MEDICINE

## 2022-07-24 PROCEDURE — 99232 SBSQ HOSP IP/OBS MODERATE 35: CPT | Performed by: INTERNAL MEDICINE

## 2022-07-24 PROCEDURE — 81025 URINE PREGNANCY TEST: CPT | Performed by: INTERNAL MEDICINE

## 2022-07-24 RX ORDER — NAPROXEN SODIUM 220 MG/1
81 TABLET, FILM COATED ORAL DAILY
Status: DISCONTINUED | OUTPATIENT
Start: 2022-07-24 | End: 2022-07-25 | Stop reason: HOSPADM

## 2022-07-24 RX ADMIN — ASPIRIN 81 MG CHEWABLE TABLET 81 MG: 81 TABLET CHEWABLE at 13:02

## 2022-07-24 RX ADMIN — LORAZEPAM 0.5 MG: 0.5 TABLET ORAL at 22:47

## 2022-07-24 RX ADMIN — ATORVASTATIN CALCIUM 10 MG: 10 TABLET, FILM COATED ORAL at 09:29

## 2022-07-24 NOTE — PROGRESS NOTES
Hospital Medicine Service -  Daily Progress Note       SUBJECTIVE   Interval History: No acute events overnight. Patient seen and examined. Chest pain resolved, waiting for cardiac cath     OBJECTIVE      Vital signs in last 24 hours:  Temp:  [36.1 °C (97 °F)-36.6 °C (97.9 °F)] 36.4 °C (97.6 °F)  Heart Rate:  [53-77] 74  Resp:  [18] 18  BP: (112-138)/(62-75) 129/68  No intake or output data in the 24 hours ending 07/24/22 1505    PHYSICAL EXAMINATION      GEN: well-developed and well-nourished; not in acute distress  HEENT: normocephalic; atraumatic  NECK: no JVD; no bruits  CARDIO: regular rate and rhythm; no murmurs, rubs or gallops  RESP: clear to auscultation bilaterally; no rales, rhonchi, or wheezes  ABD: soft, non-distended, non-tender, normal bowel sounds  EXT: no cyanosis, clubbing, or edema  SKIN: clean, dry, warm, and intact  MUSCULOSKELETAL: no injury or deformity  NEURO: alert and oriented x 3; nonfocal  BEHAVIOR/EMOTIONAL: appropriate; cooperative     LABS / IMAGING / TELE      Labs  Lab Results   Component Value Date    GLUCOSE 108 (H) 07/22/2022    CALCIUM 9.2 07/22/2022     (L) 07/22/2022    K 4.1 07/22/2022    CO2 22 07/22/2022     07/22/2022    BUN 18 07/22/2022    CREATININE 0.8 07/22/2022     Lab Results   Component Value Date    WBC 7.75 07/23/2022    HGB 14.0 07/23/2022    HCT 41.1 07/23/2022    MCV 96.9 07/23/2022     07/23/2022     Lab Results   Component Value Date    ALBUMIN 4.6 07/22/2022    BILITOT 0.7 07/22/2022    ALKPHOS 80 07/22/2022    AST 28 07/22/2022    ALT 35 07/22/2022    PROTEIN 7.6 07/22/2022       Imaging  X-RAY CHEST 2 VIEWS    Result Date: 7/22/2022  IMPRESSION: No radiographic evidence of acute cardiopulmonary disease.       ECG/Telemetry  I have independently reviewed the telemetry. No events for the last 24 hours.    ASSESSMENT AND PLAN      HTN (hypertension)  Assessment & Plan  Continue home medication    Hyperlipidemia  Assessment &  Plan    Continue home medication    Dyspnea on exertion  Assessment & Plan  Patient reports shortness of breath with exertion, denies shortness of breath at rest  She is not oxygen dependent, pulse ox 95% at rest  Continue to monitor  Plan for right and left cath tomorrow    * Chest pain  Assessment & Plan  Patient presented for evaluation of chest pressure,  Per Cardiology eval - she had mildly abnormal stress test based on information from [place].  They were planning echocardiogram and cardiac cath on Monday.  Cardiology planning for cardiac cath tomorrow, 7/25/22  NPO p MN on Sunday   Pulm consult placed  TTE ordered         VTE Assessment: Padua VTE Score: 2  Dispo: pending cardiac cath  Estimated discharge date: 7/24/2022  Code Status: Full Code     Joaquín Byers MD  7/24/2022

## 2022-07-24 NOTE — PLAN OF CARE
Problem: Adult Inpatient Plan of Care  Goal: Plan of Care Review  Outcome: Progressing  Flowsheets (Taken 7/24/2022 9022)  Progress: improving  Plan of Care Reviewed With: patient  Outcome Summary: Patient VSS. Denies chest pain. awaiting cardiac cath tomorrow.  Goal: Patient-Specific Goal (Individualized)  Outcome: Progressing  Goal: Absence of Hospital-Acquired Illness or Injury  Outcome: Progressing  Goal: Optimal Comfort and Wellbeing  Outcome: Progressing  Goal: Readiness for Transition of Care  Outcome: Progressing   Plan of Care Review  Plan of Care Reviewed With: patient  Progress: improving  Outcome Summary: Patient VSS. Denies chest pain. awaiting cardiac cath tomorrow.

## 2022-07-24 NOTE — PLAN OF CARE
Plan of Care Review  Plan of Care Reviewed With: patient  Progress: improving  Outcome Summary: pt denies pain. independent in room. no overnight concern

## 2022-07-24 NOTE — PROGRESS NOTES
SUBJECTIVE      No events. No change    OBJECTIVE     VITAL SIGNS:  Temp:  [36.1 °C (97 °F)-36.6 °C (97.9 °F)] 36.6 °C (97.9 °F)  Heart Rate:  [53-78] 58  Resp:  [18] 18  BP: (112-138)/(62-75) 118/71  SPO2 95%  No intake or output data in the 24 hours ending 07/24/22 1106    PHYSICAL EXAM:  General appearance: alert and cooperative  Head: without obvious abnormality  Eyes: PERRLA, extraocular movements intact  Neck: No JVD, carotid bruits, thyromegaly  Lungs: clear to auscultation bilaterally, no crackles or wheezing  Heart: regular rate and rhythm, S1-S2 normal, no murmurs, clicks, rubs or gallops  Abdomen: soft, non-tender, bowel sounds normal  Extremities: no edema, peripheral pulses present  Skin: Skin color, texture, turgor normal. No rashes or lesions  Neurologic: Alert and oriented X 3, no focal deficits    LABS / IMAGING / EKG / TELEMETRY     LABS:  Results from last 7 days   Lab Units 07/22/22  1356   SODIUM mEQ/L 135*   POTASSIUM mEQ/L 4.1   CHLORIDE mEQ/L 105   CO2 mEQ/L 22   BUN mg/dL 18   CREATININE mg/dL 0.8   AST IU/L 28   ALT IU/L 35     Results from last 7 days   Lab Units 07/23/22  0633 07/22/22  1356   WBC K/uL 7.75 7.65   HEMOGLOBIN g/dL 14.0 14.3   HEMATOCRIT % 41.1 41.5   PLATELETS K/uL 240 272     No results found for: HGBA1C, TSH  Lab Results   Component Value Date    CHOL 138 07/23/2022    LDLCALC 64 07/23/2022    HDL 52 (L) 07/23/2022    TRIG 112 07/23/2022     No results found for: BNP    IMAGING:      ECG:       TELEMETRY:      MEDICATIONS        • atorvastatin  10 mg oral Daily   • nebivoloL  5 mg oral Nightly       ASSESSMENT AND PLAN     MÉNDEZ: Cath tomorrow. Will do right and left. Add ASA    HTN: controlled    Dyslipidemia: On statin    Bandar Burton MD  7/24/2022    Primary Care Doctor: Glenn Velazquez,

## 2022-07-25 ENCOUNTER — APPOINTMENT (INPATIENT)
Dept: CARDIOLOGY | Facility: HOSPITAL | Age: 77
DRG: 287 | End: 2022-07-25
Attending: NURSE PRACTITIONER
Payer: MEDICARE

## 2022-07-25 VITALS
RESPIRATION RATE: 18 BRPM | HEART RATE: 76 BPM | TEMPERATURE: 97.5 F | HEIGHT: 63 IN | DIASTOLIC BLOOD PRESSURE: 64 MMHG | SYSTOLIC BLOOD PRESSURE: 125 MMHG | BODY MASS INDEX: 23.04 KG/M2 | OXYGEN SATURATION: 97 % | WEIGHT: 130 LBS

## 2022-07-25 LAB
AORTIC ROOT ANNULUS: 2.7 CM
ASCENDING AORTA: 3.7 CM
BSA FOR ECHO PROCEDURE: 1.62 M2
CATH EF ESTIMATED: 75 %
E WAVE DECELERATION TIME: 232 MS
E/A RATIO: 0.8
E/E' RATIO: 13.8
E/LAT E' RATIO: 13.5
EDV (BP): 38.1 CM3
EF (A4C): 74.7 %
EF A2C: 75.1 %
EJECTION FRACTION: 75.4 %
EST RIGHT VENT SYSTOLIC PRESSURE BY TRICUSPID REGURGITATION JET: 31 MMHG
ESV (BP): 9.39 CM3
FRACTIONAL SHORTENING: 43.9 %
INTERVENTRICULAR SEPTUM: 0.92 CM
LA ESV (BP): 19.4 CM3
LA ESV INDEX (A2C): 12.9 CM3/M2
LA ESV INDEX (BP): 11.98 CM3/M2
LA/AORTA RATIO: 1.19
LAAS-AP2: 10.3 CM2
LAAS-AP4: 9.7 CM2
LAD 2D: 3.2 CM
LALD A4C: 4.21 CM
LALD A4C: 4.31 CM
LAV-S: 20.9 CM3
LEFT ATRIUM VOLUME INDEX: 10.99 CM3/M2
LEFT ATRIUM VOLUME: 17.8 CM3
LEFT INTERNAL DIMENSION IN SYSTOLE: 2.12 CM (ref 2.3–3.49)
LEFT VENTRICLE DIASTOLIC VOLUME INDEX: 22.22 CM3/M2
LEFT VENTRICLE DIASTOLIC VOLUME: 36 CM3
LEFT VENTRICLE SYSTOLIC VOLUME INDEX: 5.63 CM3/M2
LEFT VENTRICLE SYSTOLIC VOLUME: 9.12 CM3
LEFT VENTRICULAR INTERNAL DIMENSION IN DIASTOLE: 3.78 CM (ref 3.88–5.38)
LEFT VENTRICULAR POSTERIOR WALL IN END DIASTOLE: 0.94 CM (ref 0.5–0.93)
LV DIASTOLIC VOLUME: 39.3 CM3
LV ESV (APICAL 2 CHAMBER): 9.78 CM3
LVAD-AP2: 18.1 CM2
LVAD-AP4: 17.1 CM2
LVAS-AP2: 7.58 CM2
LVAS-AP4: 7.68 CM2
LVEDVI(A2C): 24.26 CM3/M2
LVEDVI(BP): 23.52 CM3/M2
LVESVI(A2C): 6.04 CM3/M2
LVESVI(BP): 5.8 CM3/M2
LVLD-AP2: 6.95 CM
LVLD-AP4: 6.72 CM
LVLS-AP2: 5.44 CM
LVLS-AP4: 5.49 CM
MV E'TISSUE VEL-LAT: 0.06 M/S
MV E'TISSUE VEL-MED: 0.06 M/S
MV PEAK A VEL: 1.02 M/S
MV PEAK E VEL: 0.83 M/S
POCT OXYHGB: 74.8 % (ref 93–98)
POCT OXYHGB: 97.4 % (ref 93–98)
POCT TEST: ABNORMAL
POCT TEST: NORMAL
POSTERIOR WALL: 0.94 CM
TR MAX PG: 23 MMHG
TRICUSPID VALVE PEAK REGURGITATION VELOCITY: 2.39 M/S
Z-SCORE OF LEFT VENTRICULAR DIMENSION IN END DIASTOLE: -1.89
Z-SCORE OF LEFT VENTRICULAR DIMENSION IN END SYSTOLE: -2.29
Z-SCORE OF LEFT VENTRICULAR POSTERIOR WALL IN END DIASTOLE: 1.67

## 2022-07-25 PROCEDURE — B211YZZ FLUOROSCOPY OF MULTIPLE CORONARY ARTERIES USING OTHER CONTRAST: ICD-10-PCS | Performed by: INTERNAL MEDICINE

## 2022-07-25 PROCEDURE — C1751 CATH, INF, PER/CENT/MIDLINE: HCPCS | Performed by: INTERNAL MEDICINE

## 2022-07-25 PROCEDURE — B215YZZ FLUOROSCOPY OF LEFT HEART USING OTHER CONTRAST: ICD-10-PCS | Performed by: INTERNAL MEDICINE

## 2022-07-25 PROCEDURE — 71000001 HC PACU PHASE 1 INITIAL 30MIN: Performed by: INTERNAL MEDICINE

## 2022-07-25 PROCEDURE — 99239 HOSP IP/OBS DSCHRG MGMT >30: CPT | Performed by: HOSPITALIST

## 2022-07-25 PROCEDURE — 93306 TTE W/DOPPLER COMPLETE: CPT

## 2022-07-25 PROCEDURE — 25000000 HC PHARMACY GENERAL: Performed by: INTERNAL MEDICINE

## 2022-07-25 PROCEDURE — 63700000 HC SELF-ADMINISTRABLE DRUG: Performed by: NURSE PRACTITIONER

## 2022-07-25 PROCEDURE — C1887 CATHETER, GUIDING: HCPCS | Performed by: INTERNAL MEDICINE

## 2022-07-25 PROCEDURE — 63700000 HC SELF-ADMINISTRABLE DRUG: Performed by: INTERNAL MEDICINE

## 2022-07-25 PROCEDURE — 71000011 HC PACU PHASE 1 EA ADDL MIN: Performed by: INTERNAL MEDICINE

## 2022-07-25 PROCEDURE — 99999 PR OFFICE/OUTPT VISIT,PROCEDURE ONLY: CPT | Performed by: HOSPITALIST

## 2022-07-25 PROCEDURE — 63600105 HC IODINE BASED CONTRAST: Performed by: INTERNAL MEDICINE

## 2022-07-25 PROCEDURE — 93460 R&L HRT ART/VENTRICLE ANGIO: CPT | Performed by: INTERNAL MEDICINE

## 2022-07-25 PROCEDURE — 63600000 HC DRUGS/DETAIL CODE: Performed by: INTERNAL MEDICINE

## 2022-07-25 PROCEDURE — C1894 INTRO/SHEATH, NON-LASER: HCPCS | Performed by: INTERNAL MEDICINE

## 2022-07-25 PROCEDURE — 27200000 HC STERILE SUPPLY: Performed by: INTERNAL MEDICINE

## 2022-07-25 PROCEDURE — 4A023N7 MEASUREMENT OF CARDIAC SAMPLING AND PRESSURE, LEFT HEART, PERCUTANEOUS APPROACH: ICD-10-PCS | Performed by: INTERNAL MEDICINE

## 2022-07-25 RX ORDER — MIDAZOLAM HYDROCHLORIDE 2 MG/2ML
INJECTION, SOLUTION INTRAMUSCULAR; INTRAVENOUS
Status: DISCONTINUED | OUTPATIENT
Start: 2022-07-25 | End: 2022-07-25 | Stop reason: HOSPADM

## 2022-07-25 RX ORDER — NITROGLYCERIN 0.4 MG/1
0.4 TABLET SUBLINGUAL EVERY 5 MIN PRN
Status: DISCONTINUED | OUTPATIENT
Start: 2022-07-25 | End: 2022-07-25 | Stop reason: HOSPADM

## 2022-07-25 RX ORDER — HEPARIN SODIUM 1000 [USP'U]/ML
INJECTION, SOLUTION INTRAVENOUS; SUBCUTANEOUS
Status: DISCONTINUED | OUTPATIENT
Start: 2022-07-25 | End: 2022-07-25 | Stop reason: HOSPADM

## 2022-07-25 RX ORDER — LIDOCAINE HYDROCHLORIDE 10 MG/ML
INJECTION, SOLUTION INFILTRATION; PERINEURAL
Status: DISCONTINUED | OUTPATIENT
Start: 2022-07-25 | End: 2022-07-25 | Stop reason: HOSPADM

## 2022-07-25 RX ORDER — FENTANYL CITRATE 50 UG/ML
INJECTION, SOLUTION INTRAMUSCULAR; INTRAVENOUS
Status: DISCONTINUED | OUTPATIENT
Start: 2022-07-25 | End: 2022-07-25 | Stop reason: HOSPADM

## 2022-07-25 RX ORDER — ATROPINE SULFATE 0.1 MG/ML
0.5 INJECTION INTRAVENOUS EVERY 5 MIN PRN
Status: DISCONTINUED | OUTPATIENT
Start: 2022-07-25 | End: 2022-07-25 | Stop reason: HOSPADM

## 2022-07-25 RX ORDER — ACETAMINOPHEN 325 MG/1
650 TABLET ORAL EVERY 4 HOURS PRN
Status: DISCONTINUED | OUTPATIENT
Start: 2022-07-25 | End: 2022-07-25 | Stop reason: HOSPADM

## 2022-07-25 RX ORDER — SODIUM CHLORIDE 450 MG/100ML
INJECTION, SOLUTION INTRAVENOUS CONTINUOUS
Status: ACTIVE | OUTPATIENT
Start: 2022-07-25 | End: 2022-07-25

## 2022-07-25 RX ORDER — NICARDIPINE HCL-0.9% SOD CHLOR 1 MG/10 ML
SYRINGE (ML) INTRAVENOUS
Status: DISCONTINUED | OUTPATIENT
Start: 2022-07-25 | End: 2022-07-25 | Stop reason: HOSPADM

## 2022-07-25 RX ADMIN — ATORVASTATIN CALCIUM 10 MG: 10 TABLET, FILM COATED ORAL at 09:02

## 2022-07-25 RX ADMIN — ASPIRIN 81 MG CHEWABLE TABLET 81 MG: 81 TABLET CHEWABLE at 09:02

## 2022-07-25 NOTE — NURSING NOTE
Patient discharged per order. Pt ambulated in hallway prior to discharge and maintained O2 sats >95%. Discharge instructions reviewed with patient and daughter at bedside. All questions answered. IV & heart monitor removed. Pt daughter provided transportation home.

## 2022-07-25 NOTE — PRE-PROCEDURE NOTE
Cardiac Cath Lab Pre-procedure Note    - Patient was seen and examined at bedside.  - The patient's chart and all data was reviewed.  - The procedure, treatment alternatives, risks and benefits were explained with specific risks discussed.  - Patient was consented for cardiac cath procedure and possible PCI.  - Patient's case was found appropriate for dual antiplatelet therapy.    Patient's clinical presentation to the cardiac cath lab: stable ischemic symptoms.       Patient appears to be vulnerable.         Total anti-anginal medications: 1.         Pre-sedation assessment  ASA 3   Mallampati class: III - soft palate, base of uvula visible.

## 2022-07-25 NOTE — PROGRESS NOTES
Hospital Medicine Service -  Daily Progress Note       SUBJECTIVE   Interval History: Patient seen and examined in room 0341W. Pt reports she gets the chest pain and SOB with minimal exertion including watering the house plants. Pt requests pulmonary evaluation while hospitalized.      OBJECTIVE      Vital signs in last 24 hours:  Temp:  [36.2 °C (97.1 °F)-36.6 °C (97.9 °F)] 36.2 °C (97.2 °F)  Heart Rate:  [52-76] 60  Resp:  [14-18] 14  BP: (127-185)/(63-97) 153/81    Intake/Output Summary (Last 24 hours) at 7/25/2022 1331  Last data filed at 7/25/2022 1118  Gross per 24 hour   Intake 100 ml   Output 5 ml   Net 95 ml       PHYSICAL EXAMINATION      General:  Alert, cooperative, in no acute distress   Head:  Normocephalic, atraumatic   Eyes:  conjunctiva clear, sclera anicteric, EOMI BL   Mouth/Throat:  Mucosa moist, lips and tongue normal and extends to the midline, pharynx clear   Neck:  Supple, symmetrical, trachea midline   Lungs:  Respirations unlabored, clear to auscultation bilaterally   Heart:  RRR, S1 and S2 normal, no m/r/g   Abdomen:  Soft, non-tender, (+) BS, ND, no masses, no organomegaly   Extremities:  Extremities normal, atraumatic, no cyanosis,  No edema   Musculoskeletal:  No injury or deformity, moves all limbs with good range of motion   Pulses:  2+ and symmetric x4 extremities   Skin:  Skin color, texture, and turgor normal, no rashes or lesions   Neurologic:  AAOx3. Grossly non-focal   Behavior/Emotional  Appropriate, cooperative       LINES, CATHETERS, DRAINS, AIRWAYS, AND WOUNDS   Lines, Drains, Airways, Wounds:  Peripheral IV (Adult) 07/22/22 Right Wrist (Active)   Number of days: 3       Catheterization Site - Arterial Right Radial 6 Fr. (Active)   Number of days: 0       Catheterization Site - Venous Right Brachial 6 Fr. (Active)   Number of days: 0       Comments:         LABS / IMAGING / TELE      Labs  Results from last 7 days   Lab Units 07/23/22  0633 07/22/22  1356   WBC K/uL 7.75  7.65   HEMOGLOBIN g/dL 14.0 14.3   HEMATOCRIT % 41.1 41.5   PLATELETS K/uL 240 272       Results from last 7 days   Lab Units 07/22/22  1356   SODIUM mEQ/L 135*   POTASSIUM mEQ/L 4.1   CHLORIDE mEQ/L 105   CO2 mEQ/L 22   BUN mg/dL 18   CREATININE mg/dL 0.8   CALCIUM mg/dL 9.2   ALBUMIN g/dL 4.6   BILIRUBIN TOTAL mg/dL 0.7   ALK PHOS IU/L 80   ALT IU/L 35   AST IU/L 28   GLUCOSE mg/dL 108*             High Sens Troponin I   Date Value Ref Range Status   07/22/2022 3.8 <15 pg/mL Final   07/22/2022 3.3 <15 pg/mL Final               SARS-CoV-2 (COVID-19) (no units)   Date/Time Value   07/22/2022 1535 Negative         Imaging      CXR 7/22/2022 personally reviewed, no acute disease in the chest      ECG/Telemetry  I have independently reviewed the telemetry. Significant findings include sinus at 82 bpm, some sinus kvng ot upper 40s overnight.    ASSESSMENT AND PLAN      * Chest pain  Assessment & Plan  Patient presented for evaluation of chest pressure,  Per Cardiology eval - she had mildly abnormal stress test based on information from [place].      TTE results pending  pt had right and left heart cath with normal left and right pressures, patent coronary arteries    pulm consult pending      HTN (hypertension)  Assessment & Plan  Continue home medication    Hyperlipidemia  Assessment & Plan    Continue home medication    Dyspnea on exertion  Assessment & Plan  Patient reports shortness of breath with exertion which improves with rest  denies shortness of breath at rest  She is not oxygen dependent, pulse ox 95% at rest  Continue to monitor  cardiac work up negative as above  pulm consult pending       VTE Assessment: Padua VTE Score: 2  VTE Prophylaxis:  Current anticoagulants:    •None      Code Status: Full Code  Palliative Care Screening Score: 1   Estimated Discharge Date: 7/26/2022     Disposition Planning: anticipate DC to home tomorrow after pulm evaluation     Luis Madrigal DO  7/25/2022

## 2022-07-25 NOTE — DISCHARGE INSTRUCTIONS
-Call [phone] to schedule a follow-up appointment with Dr. Devendra Adame in 1 week. [facility] [address], [address], [place] [address].   -Continue to take aspirin 81 mg by mouth daily.    Precautions/instructions following cardiac cath via radial artery/wrist:  -NO DRIVING FOR 24 HOURS  --Limit use of affected arm for 24 hrs  --No lifting anything greater than 5lbs for 3 days with affected wrist  --Remove dressing the following day. Keep site clean and dry  --Avoid submerging wrist in sitting water (tub, hot tub, pool, dish-washing, ocean) for 7 days. May shower  --Avoid activities where the wrist may get heavily soiled (such as gardening, housecleaning etc) for 7 days.  --Check the site daily, call physician if you notice swelling, redness, drainage, increased discomfort, new numbness or fever greater than 101F  --IF BLEEDING OCCURS:  ---sit and apply firm pressure to site with your fingers for 10 minutes. If bleeding stops, continue to sit quietly keeping your wrist straight for 2 hrs and notify your physician as soon as possible.  ---If bleeding does not stop after 10 minutes or if there is a large amount of bleeding or spurting call 911 IMMEDIATELY, DO NOT DRIVE YOURSELF TO THE HOSPITAL         Pulmonology follow-up within 2 to 4 weeks of discharge.  Please call [phone] to schedule an appointment.

## 2022-07-25 NOTE — PROGRESS NOTES
Cardiology Progress Note       Subjective     No complaints at rest. Reports some SOB with walking around. Denies chest pain, palpitations.       Objective     Vital signs in last 24 hours  Temp:  [36.2 °C (97.1 °F)-36.6 °C (97.9 °F)] 36.3 °C (97.4 °F)  Heart Rate:  [52-76] 55  Resp:  [17-18] 18  BP: (127-141)/(63-79) 134/69    No intake or output data in the 24 hours ending 07/25/22 0748      Physical Exam     Constitutional:  Well-developed and well-nourished. NAD  Head: atraumatic, normocephalic   Neck: No JVD present. Carotid bruit is not present.   Cardiovascular: Regular rate and rhythm.  no murmur, rubs, gallops.  Pulmonary/Chest: Clear to auscultation bilaterally.  Abdomen: soft, NT/ND.  Extremities: No clubbing/cyanosis/edema.   Neurological: Alert and oriented to person, place, and time. Appropriate affect  Skin: warm, dry    Labs  Results from last 7 days   Lab Units 07/22/22  1356   SODIUM mEQ/L 135*   POTASSIUM mEQ/L 4.1   CHLORIDE mEQ/L 105   CO2 mEQ/L 22   BUN mg/dL 18   CREATININE mg/dL 0.8   AST IU/L 28   ALT IU/L 35     Results from last 7 days   Lab Units 07/23/22  0633 07/22/22  1356   WBC K/uL 7.75 7.65   HEMOGLOBIN g/dL 14.0 14.3   HEMATOCRIT % 41.1 41.5   PLATELETS K/uL 240 272     No results found for: HGBA1C, TSH  Lab Results   Component Value Date    CHOL 138 07/23/2022    LDLCALC 64 07/23/2022    HDL 52 (L) 07/23/2022    TRIG 112 07/23/2022     No results found for: BNP  High Sens Troponin I   Date Value Ref Range Status   07/22/2022 3.8 <15 pg/mL Final   07/22/2022 3.3 <15 pg/mL Final        Current Meds  • aspirin  81 mg oral Daily   • atorvastatin  10 mg oral Daily   • nebivoloL  5 mg oral Nightly       Telemetry  sinus      Assessment & Plan  1. MÉNDEZ : for left and right heart cath today. Had stress test at outside hospital though calcium score was found to be 0.  Continues to have symptoms.  Echocardiogram will evaluate cardiac structure and function and left and right heart  catheterization will evaluate for coronary artery disease, abnormal pressures, etc.    2.  Hypertensive heart disease: Blood pressure reasonably controlled on the current medicine.  Continue same medicine.    3.  Mixed hyperlipidemia: Continue atorvastatin 10 mg daily.  LDL is under 70.    If no significant abnormalities found on echocardiogram, cardiac catheterization-would recommend pulmonary work-up in search for noncardiac causes of dyspnea on exertion.      Levy Brooke MD  7/25/2022  Pager #[phone]

## 2022-07-25 NOTE — CONSULTS
Pulmonary/Critical Care   Consult Note     Indication for Consultation:  Dyspnea on exertion     HISTORY OF PRESENT ILLNESS        This is a 77-year-old female with a past medical history of hypertension, colon cancer status postresection and chemotherapy now in remission, former tobacco use quit in 1975 who presents with dyspnea on exertion.    She reports that for the last several months she has had a few episodes of severe dyspnea on exertion.  Her symptoms resolved with a short period of rest.  He denies associated symptoms of cough, wheezing, fevers, chills, lower extremity edema, calf asymmetry or tenderness, orthopnea, PND.  This has occurred during her aerobics class, gardening and most recently while at the beach in [place].  She was evaluated at hospital in [place] which included chest x-ray which was normal, coronary calcium CT scan which was normal, and a stress test which was also normal.  She had another episode last week which prompted her presentation to [facility].  She was admitted on 7/22/2022.  Her evaluation here, so far is included another normal chest x-ray, TTE, right and left heart catheterizations all of which have been normal.    She denies any pulmonary history.  She has no history of asthma, COPD.  She smoked in college but quit 50 years ago.  She has a history of allergies to trees, dust, pollen which is controlled with as needed Allegra and budesonide nasal spray..  No history of anemia, bronchitis.  PAST MEDICAL AND SURGICAL HISTORY        Past Medical History:   Diagnosis Date   • Anxiety    • Hypertension        Past Surgical History:   Procedure Laterality Date   • APPENDECTOMY  1957   • COLON SURGERY  2014    Tumor removed   • TONSILLECTOMY  1967   • TUBAL LIGATION  1978       PCP: Glenn Velazquez, DO      MEDICATIONS        Home Medications:  •  calcium carbonate, Take 1 tablet by mouth every morning.  •  cholecalciferol (vitamin D3), Take 2,000 Units by mouth every  "morning.  •  LORazepam, Take 0.5 mg by mouth every 8 (eight) hours as needed for anxiety or sedation.  •  nebivoloL, Take 5 mg by mouth nightly.  •  nitroglycerin, Place 0.4 mg under the tongue every 5 (five) minutes as needed for chest pain.  •  atorvastatin, Take 10 mg by mouth every morning.    Current Medications:  • aspirin  81 mg oral Daily   • atorvastatin  10 mg oral Daily   • nebivoloL  5 mg oral Nightly         ALLERGIES        Nsaids (non-steroidal anti-inflammatory drug) and Penicillins    FAMILY HISTORY        Family History   Problem Relation Age of Onset   • Leukemia Biological Father        SOCIAL HISTORY        Social History     Socioeconomic History   • Marital status:      Spouse name: None   • Number of children: None   • Years of education: None   • Highest education level: None   Tobacco Use   • Smoking status: Former Smoker     Quit date:      Years since quittin.5   • Smokeless tobacco: Never Used   Substance and Sexual Activity   • Alcohol use: Yes     Alcohol/week: 1.0 standard drink     Types: 1 Glasses of wine per week     Comment: occasionally   • Drug use: Never   • Sexual activity: Defer     Social Determinants of Health     Food Insecurity: No Food Insecurity   • Worried About Running Out of Food in the Last Year: Never true   • Ran Out of Food in the Last Year: Never true       REVIEW OF SYSTEMS        A Full 10 point review of systems was obtained and is negative then otherwise stated in the HPI    PHYSICAL EXAMINATION      Vital Signs:     Temp (72hrs), Av.4 °C (97.6 °F), Min:36.1 °C (97 °F), Max:36.7 °C (98 °F)      Visit Vitals  /64 (BP Location: Left upper arm, Patient Position: Lying)   Pulse 76   Temp 36.4 °C (97.5 °F) (Temporal)   Resp 18   Ht 1.6 m (5' 3\")   Wt 59 kg (130 lb)   SpO2 97%   BMI 23.03 kg/m²       I/O's:      Intake/Output Summary (Last 24 hours) at 2022 1531  Last data filed at 2022 1118  Gross per 24 hour   Intake 100 ml "   Output 5 ml   Net 95 ml       General: The patient is in no acute distress.  Resting comfortably in bed.  HEENT: Mucous membranes are moist.  Sclera are anicteric.  Neck: Supple.  No cervical lymphadenopathy  Cardiovascular: S1 and S2 are present.  There are no murmurs.  Lungs: Clear to auscultation bilaterally without wheezes or rhonchi  Abdomen: Soft, nontender and nondistended  Extremities: Cool and dry without edema  Neuro: Awake and alert.  Spontaneously moving all extremities    Diagnostic Data        Ventilator Settings:         Labs:    I have personally reviewed all pertinent patient laboratory results. Labs of note discussed below:    ABG Results    No lab values to display.       BMP Results       07/22/22     1356        K 4.1    Cl 105    CO2 22    Glucose 108    BUN 18    Creatinine 0.8    Calcium 9.2    Anion Gap 8    EGFR >60.0         Comment for K at 1356 on 07/22/22: Results obtained on plasma. Plasma Potassium values may be up to 0.4 mEQ/L less than serum values. The differences may be greater for patients with high platelet or white cell counts.        CBC Results       07/23/22 07/22/22     0633 1356    WBC 7.75 7.65    RBC 4.24 4.43    HGB 14.0 14.3    HCT 41.1 41.5    MCV 96.9 93.7    MCH 33.0 32.3    MCHC 34.1 34.5     272        Imaging:    I have personally reviewed all pertinent imaging which is discussed below:    Chest X-ray 7/22/22: Normal. No effusions, edema, infiltrates, pneumothorax, no cardiomegaly.       ASSESSMENT AND RECOMMENDATIONS         This is a 77 year old female with PMHx of HTN who presented with shortness of breath .    1. Dyspnea on exertion - She reports several months of dyspnea that occurs only on exertion without associated symptoms. Her dyspnea resolves with rest. She has not had any episodes while here. An extensive workup has yet to reveal a diagnosis. She reportedly underwent stress testing and CT coronary testing at and OSH which were normal  and here she had both right and left heart catheterizations which were normal. She has not had any evidence of arrhythmia on her telemetry. She does not have anemia.  Low suspicion for pulmonary embolism given the intermittent nature of her dyspnea.  Use several PE risk calculators and she has been ruled out on every single one of them based on her epidemiologic risk and clinical factors.  Given that she recently had IV contrast for her right and left heart catheterization would not pursue CT chest with IV contrast at this time. Her pulmonary exam is normal. Her chest x-ray is normal. She does not have a history of asthma/COPD with minimal smoking history and stopped 50 years ago. She has been started on Nasacort for post-nasal drip. She denies reflux symptoms. As an outpatient, we can check pulmonary function testing including methacholine challenge and CT chest if we still do not find an etiology for her dyspnea. I do not suspect deconditioning as she is active in aerobics class, gardening, walking. I suspect this may be related to anxiety as she was recently prescribed ativan and sertraline for this by her PCP.     Plan:  - For completeness, please check ambulatory pulse-ox prior to discharge  - Pulmonary follow-up in outpatient for PFTs. If these are unrevealing will plan on CT chest. I put our office contact information in her discharge instructions.    - Encouraged continued exercise   - Continue Nasacort          Hernan Moses DO  Pulmonary & Critical Care Medicine  7/25/2022  3:35 PM

## 2022-07-26 NOTE — DISCHARGE SUMMARY
Hospital Medicine Service -  Inpatient Discharge Summary        BRIEF OVERVIEW   Admitting Provider: Estefania Bird MD  Attending Provider: No att. providers found Attending phys phone: N/A    PCP: Glenn Velazquez [phone]    Admission Date: 7/22/2022  Discharge Date: 7/25/2022     DISCHARGE DIAGNOSES      Primary Discharge Diagnosis  Chest pain    Secondary Discharge Diagnoses  Active Hospital Problems    Diagnosis Date Noted    Chest pain 07/22/2022     Priority: High    Dyspnea on exertion 07/22/2022     Priority: Medium    Hyperlipidemia 07/22/2022     Priority: Medium    HTN (hypertension) 07/22/2022     Priority: Medium    Chest pain, unspecified type 07/23/2022      Resolved Hospital Problems   No resolved problems to display.       SUMMARY OF HOSPITALIZATION      Presenting Problem/History of Present Illness  Chest pain [R07.9]  Dyspnea on exertion [R06.00]  MÉNDEZ (dyspnea on exertion) [R06.00]  Chest pain, unspecified type [R07.9]    This is a 77 y.o. year-old female admitted on 7/22/2022 with Chest pain [R07.9]  Dyspnea on exertion [R06.00]  MÉNDEZ (dyspnea on exertion) [R06.00]  Chest pain, unspecified type [R07.9].      Hospital Course    Patient was seen in consultation by cardiology and a right and left heart cath was peformed which revealed no obstructive coronary disease and normal R and L pressures. Cardiac etiology for CP and MÉNDEZ was ruled out.    Patient was then seen in consultation by pulmonary who did not recommend CT at this time given low suspicion for PE in setting of recent dye load. Plan will be for outpt follow up with pulmonary for PFTs and consideration of imaging at that time.    The exact etiology of the patient's chest pain is unknown at time of discharge.  Anxiety is still on the differential diagnosis and can be explored further as an outpatient.    Pt is stable for DC to home.      See below for the specific management per diagnosis.       Problem List on Day of  Discharge  * Chest pain  Assessment & Plan  Patient presented for evaluation of chest pressure,  Per Cardiology eval - she had mildly abnormal stress test based on information from [place].      TTE results pending  pt had right and left heart cath with normal left and right pressures, patent coronary arteries    pulm consult pending      HTN (hypertension)  Assessment & Plan  Continue home medication    Hyperlipidemia  Assessment & Plan    Continue home medication    Dyspnea on exertion  Assessment & Plan  Patient reports shortness of breath with exertion which improves with rest  denies shortness of breath at rest  She is not oxygen dependent, pulse ox 95% at rest  Continue to monitor  cardiac work up negative as above  pulm consult pending        Exam on Day of Discharge  vitals stable  lungs clear  heart regular    Consults During Admission  IP CONSULT TO CARDIOLOGY  IP CONSULT TO PULMONOLOGY/SLEEP MEDICINE    DISCHARGE MEDICATIONS        Medication List        CONTINUE taking these medications      atorvastatin 10 mg tablet  Commonly known as: LIPITOR  Take 10 mg by mouth every morning.  Dose: 10 mg     calcium carbonate 500 mg calcium (1,250 mg) tablet  Commonly known as: OS-DUSTY  Take 1 tablet by mouth every morning.  Dose: 1 tablet     cholecalciferol (vitamin D3) 50 mcg (2,000 unit) capsule  Take 2,000 Units by mouth every morning.  Dose: 2,000 Units     LORazepam 0.5 mg tablet  Commonly known as: ATIVAN  Take 0.5 mg by mouth every 8 (eight) hours as needed for anxiety or sedation.  Dose: 0.5 mg     nebivoloL 5 mg tablet  Commonly known as: BYSTOLIC  Take 5 mg by mouth nightly.  Dose: 5 mg     nitroglycerin 0.4 mg SL tablet  Commonly known as: NITROSTAT  Place 0.4 mg under the tongue every 5 (five) minutes as needed for chest pain.  Dose: 0.4 mg              Instructions for after discharge       Follow-up with Provider:      Call for follow up appointment with the pulmonologist.    Hernan Moses DO    [phone]    [address]  [address]  [place] [address]       Follow-up with provider      Follow up with your PCP in 1-2 weeks    Glenn Velazquez DO   [phone]    [facility]  [address]  [address]  [place] [address]                  PROCEDURES / LABS / IMAGING      Operative Procedures      Other Procedures  Right and Left Cardiac catheterization    Pertinent Labs    Results from last 7 days   Lab Units 07/23/22  0633 07/22/22  1356   WBC K/uL 7.75 7.65   HEMOGLOBIN g/dL 14.0 14.3   HEMATOCRIT % 41.1 41.5   PLATELETS K/uL 240 272       Results from last 7 days   Lab Units 07/22/22  1356   SODIUM mEQ/L 135*   POTASSIUM mEQ/L 4.1   CHLORIDE mEQ/L 105   CO2 mEQ/L 22   BUN mg/dL 18   CREATININE mg/dL 0.8   CALCIUM mg/dL 9.2   ALBUMIN g/dL 4.6   BILIRUBIN TOTAL mg/dL 0.7   ALK PHOS IU/L 80   ALT IU/L 35   AST IU/L 28   GLUCOSE mg/dL 108*             Pertinent Imaging  X-RAY CHEST 2 VIEWS    Result Date: 7/22/2022  IMPRESSION: No radiographic evidence of acute cardiopulmonary disease.     Echocardiogram 2/25/2022:  Normal-sized LV. Estimated EF 65%. Normal LV wall thickness.  Normal-sized RV. Normal RV systolic function.  Normal-sized LA.  Normal-sized RA.  Aortic root normal.  Aortic valve structure is normal.  Normal leaflet structure of the mitral valve.  Trace mitral valve regurgitation.  Tricuspid valve structure is normal. Mild tricuspid valve regurgitation.  Estimated RVSP = 31 mmHg.  Grossly normal pulmonic valve structure.  Normal-sized IVC.  No evidence of pericardial effusion.            OUTPATIENT  FOLLOW-UP / REFERRALS / PENDING TESTS        Outpatient Follow-Up Appointments  Encounter Information    This patient does not currently have any appointments scheduled.         Referrals  No orders of the defined types were placed in this encounter.      Test Results Pending at Discharge  Unresulted Labs (From admission, onward)                None            Important Issues to  Address in Follow-Up  follow up with pulmonary as outpt in 2-4 weeks for PFTs and reconsideration of imaging    DISCHARGE DISPOSITION      Disposition: Home     Code Status At Discharge: Full    Time for discharge including coordination of care with Case management, social work, specialist, written discharge instructions, medication reconciliation and counseling to patient and family = 40 min    Physician Order for Life-Sustaining Treatment Document Status        No documents found                    Luis Madrigal DO  7/25/2022

## 2022-08-04 ENCOUNTER — TRANSCRIBE ORDERS (OUTPATIENT)
Dept: SCHEDULING | Age: 77
End: 2022-08-04

## 2022-08-04 DIAGNOSIS — R06.09 OTHER FORMS OF DYSPNEA: Primary | ICD-10-CM

## 2022-08-15 NOTE — ED ATTESTATION NOTE
Procedures  Physical Exam  Review of Systems    8/15/122269:30 AM  I have personally seen and examined the patient.  I reviewed and agree with the PA/NP/Resident's assessment and plan of care.    My examination, assessment, and plan of care of Karin Salas is as follows:  The patient presents with chest pressure and shortness of breath.  This is a 77-year-old female with history of hypertension, hyperlipidemia and colon cancer.  She states that her pain began approximately 1 week ago in [place].  She was seen by a cardiologist there who recommended stress test.  The patient underwent a stress test and the patient was told she would need to have PCI with stenting.  Exam: In the emergency department, the patient is alert in no acute distress.  She is asymptomatic.  Her heart was regular and her lungs were clear.  There was no peripheral edema or tenderness in her legs.  Impression/Plan: The patient evaluated with lab work which unremarkable.  Chest x-ray showed no active disease.  The EKG did not reveal any evidence of ischemia.  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  The patient was admitted to the medical service and cardiology was consulted.  She will likely go for TAMARA followed by intervention on Monday.  Vital Sign Review: Vital signs have been ordered and reviewed. The oxygen saturation is 97% on room air, normal     I was physically present for the key/critical portions of the following procedures: None    This document was created using dragon dictation software.  There might be some typographical errors due to this technology.     Damian Zimmerman,   08/15/22 1135

## 2022-08-22 ENCOUNTER — HOSPITAL ENCOUNTER (OUTPATIENT)
Dept: PULMONOLOGY | Facility: HOSPITAL | Age: 77
Discharge: HOME | End: 2022-08-22
Attending: STUDENT IN AN ORGANIZED HEALTH CARE EDUCATION/TRAINING PROGRAM
Payer: MEDICARE

## 2022-08-22 DIAGNOSIS — R06.09 OTHER FORMS OF DYSPNEA: ICD-10-CM

## 2022-08-22 PROCEDURE — 94726 PLETHYSMOGRAPHY LUNG VOLUMES: CPT

## 2022-08-22 PROCEDURE — 94729 DIFFUSING CAPACITY: CPT

## 2022-08-22 PROCEDURE — 94618 PULMONARY STRESS TESTING: CPT

## 2022-08-22 PROCEDURE — 63700000 HC SELF-ADMINISTRABLE DRUG: Performed by: STUDENT IN AN ORGANIZED HEALTH CARE EDUCATION/TRAINING PROGRAM

## 2022-08-22 PROCEDURE — 94060 EVALUATION OF WHEEZING: CPT

## 2022-08-22 RX ORDER — ALBUTEROL SULFATE 90 UG/1
4 INHALANT RESPIRATORY (INHALATION) ONCE
Status: COMPLETED | OUTPATIENT
Start: 2022-08-22 | End: 2022-08-22

## 2022-08-22 RX ADMIN — ALBUTEROL SULFATE 4 PUFF: 90 AEROSOL, METERED RESPIRATORY (INHALATION) at 10:20

## 2023-02-01 ENCOUNTER — TELEPHONE (OUTPATIENT)
Dept: NEUROSURGERY | Facility: CLINIC | Age: 78
End: 2023-02-01

## 2023-02-01 NOTE — TELEPHONE ENCOUNTER
Spoke with patient - she is due for yearly brain MRI, She will go to [facility] to have done.

## 2023-02-25 ENCOUNTER — HOSPITAL ENCOUNTER (OUTPATIENT)
Dept: RADIOLOGY | Facility: HOSPITAL | Age: 78
Discharge: HOME | End: 2023-02-25
Attending: PHYSICIAN ASSISTANT
Payer: MEDICARE

## 2023-02-25 VITALS — BODY MASS INDEX: 22.67 KG/M2 | WEIGHT: 128 LBS

## 2023-02-25 DIAGNOSIS — D32.0 CEREBRAL MENINGIOMA (CMS/HCC): ICD-10-CM

## 2023-02-25 RX ORDER — GADOBUTROL 604.72 MG/ML
0.1 INJECTION INTRAVENOUS ONCE
Status: COMPLETED | OUTPATIENT
Start: 2023-02-25 | End: 2023-02-25

## 2023-02-25 RX ADMIN — GADOBUTROL 5.8 MMOL: 604.72 INJECTION INTRAVENOUS at 11:31

## 2023-03-02 ENCOUNTER — DOCUMENTATION (OUTPATIENT)
Dept: NEUROSURGERY | Facility: CLINIC | Age: 78
End: 2023-03-02
Payer: MEDICARE

## 2023-03-02 DIAGNOSIS — D32.0 CEREBRAL MENINGIOMA (CMS/HCC): Primary | ICD-10-CM

## 2023-03-02 NOTE — PROGRESS NOTES
Called patient regarding MRI results to follow known right lateral posterior fossa meningioma and meningoma posterior to the left internal  auditory canal. There is overall no change in the last year and no evidence of any surrounding edema. Patient feels well without any headache, hearing changes, tinnitus, vertigo, headaches or any other neurologic symptoms. Recommend repeat MRI brain in 1 year with and without contrast. She will call with any questions or concerns.

## 2024-01-17 ENCOUNTER — TELEPHONE (OUTPATIENT)
Dept: NEUROSURGERY | Facility: CLINIC | Age: 79
End: 2024-01-17

## 2024-02-05 ENCOUNTER — HOSPITAL ENCOUNTER (OUTPATIENT)
Dept: RADIOLOGY | Facility: HOSPITAL | Age: 79
Discharge: HOME | End: 2024-02-05
Attending: PHYSICIAN ASSISTANT
Payer: MEDICARE

## 2024-02-05 DIAGNOSIS — D32.0 CEREBRAL MENINGIOMA (CMS/HCC): ICD-10-CM

## 2024-02-05 RX ORDER — GADOBUTROL 604.72 MG/ML
0.1 INJECTION INTRAVENOUS ONCE
Status: COMPLETED | OUTPATIENT
Start: 2024-02-05 | End: 2024-02-05

## 2024-02-05 RX ADMIN — GADOBUTROL 6 ML: 604.72 INJECTION INTRAVENOUS at 14:12

## 2024-02-28 ENCOUNTER — DOCUMENTATION (OUTPATIENT)
Dept: NEUROSURGERY | Facility: CLINIC | Age: 79
End: 2024-02-28
Payer: MEDICARE

## 2024-02-28 NOTE — PROGRESS NOTES
Patient had annual screening MRI completed to follow cerebellar meningioma.  In 2018 the meningioma measured 2.5 x 2.4 x 2 cm.  Over the last 5 years, the meningioma is now measuring 3.3 x 3.0 x 3.8 cm.  The patient continues to remain asymptomatic without any vertigo, nausea, vomiting or double vision.  She overall feels well.  She does not have any headaches.  Given the consistency and growth, we have given her a referral to Dr. Serafin Camacho at [facility] [place] for further evaluation.  The lesion abuts the transverse sinus and is high risk for surgical intervention.  The patient agrees with the plan as stated and understands to call with any additional questions or concerns.

## 2024-05-16 ENCOUNTER — BMR PREADMISSION ASSESSMENT (INPATIENT)
Dept: ADMISSIONS | Facility: REHABILITATION | Age: 79
End: 2024-05-16
Payer: MEDICARE

## 2024-05-16 PROBLEM — L65.9 HAIR LOSS: Status: ACTIVE | Noted: 2024-04-29

## 2024-05-16 PROBLEM — R79.89 ABNORMAL LFTS: Status: ACTIVE | Noted: 2019-11-14

## 2024-05-16 PROBLEM — D32.9 MENINGIOMA (CMS/HCC): Chronic | Status: ACTIVE | Noted: 2024-04-25

## 2024-05-16 NOTE — HOSPITAL COURSE
79 y.o. female admitted 5/13/2024 for meningioma resection. Per medical records, h/o HTN, HLD, colon cancer s/p resection and chemo, and meningoma (found incidentially in 2008). Most recent scans showed progressive enlargement of the meningioma.  She underwent a planned right retrosigmoid craniotomy with resection of meningioma on 5/13/24.

## (undated) DEVICE — TR BAND REGULAR

## (undated) DEVICE — OPTITORQUE 6FR RADIAL TIG 4.0 110CM

## (undated) DEVICE — CATH 6FR PIG 145 ANGLE

## (undated) DEVICE — GLIDESHEATH SLENDER SS (.021) 6FR 10CM

## (undated) DEVICE — KIT CATH LAB ANGIO

## (undated) DEVICE — CATH SWANGANZ 6FR 2LUMEN

## (undated) DEVICE — ***USE 121412***PACK DEVICE IMPLANT TLH